# Patient Record
Sex: MALE | Race: BLACK OR AFRICAN AMERICAN | Employment: OTHER | URBAN - METROPOLITAN AREA
[De-identification: names, ages, dates, MRNs, and addresses within clinical notes are randomized per-mention and may not be internally consistent; named-entity substitution may affect disease eponyms.]

---

## 2017-01-03 ENCOUNTER — HOSPITAL ENCOUNTER (OUTPATIENT)
Dept: PHYSICAL THERAPY | Age: 67
Discharge: HOME | End: 2017-01-26
Attending: ORTHOPAEDIC SURGERY | Admitting: ORTHOPAEDIC SURGERY

## 2017-01-03 ASSESSMENT — PAIN DESCRIPTION - DESCRIPTORS: DESCRIPTORS: ACHING

## 2017-01-03 ASSESSMENT — PAIN DESCRIPTION - FREQUENCY: FREQUENCY: CONTINUOUS

## 2017-01-03 ASSESSMENT — PAIN DESCRIPTION - ONSET: ONSET: AWAKENED FROM SLEEP

## 2017-01-03 ASSESSMENT — PAIN DESCRIPTION - LOCATION: LOCATION: HIP;KNEE

## 2017-01-03 ASSESSMENT — PAIN DESCRIPTION - PAIN TYPE: TYPE: CHRONIC PAIN

## 2017-01-03 ASSESSMENT — PAIN SCALES - GENERAL: PAINLEVEL_OUTOF10: 9

## 2017-01-03 ASSESSMENT — PAIN DESCRIPTION - ORIENTATION: ORIENTATION: RIGHT;LEFT

## 2017-01-10 ENCOUNTER — HOSPITAL ENCOUNTER (OUTPATIENT)
Dept: PHYSICAL THERAPY | Age: 67
Discharge: HOME | End: 2017-01-10
Admitting: ORTHOPAEDIC SURGERY

## 2017-01-12 ENCOUNTER — OFFICE VISIT (OUTPATIENT)
Dept: PRIMARY CARE CLINIC | Age: 67
End: 2017-01-12

## 2017-01-12 VITALS
BODY MASS INDEX: 39.17 KG/M2 | SYSTOLIC BLOOD PRESSURE: 112 MMHG | DIASTOLIC BLOOD PRESSURE: 70 MMHG | OXYGEN SATURATION: 96 % | HEIGHT: 75 IN | HEART RATE: 70 BPM | TEMPERATURE: 97 F | WEIGHT: 315 LBS

## 2017-01-12 DIAGNOSIS — E78.2 MIXED HYPERLIPIDEMIA: ICD-10-CM

## 2017-01-12 DIAGNOSIS — Z00.00 PHYSICAL EXAM: Primary | ICD-10-CM

## 2017-01-12 DIAGNOSIS — Z12.11 SCREEN FOR COLON CANCER: ICD-10-CM

## 2017-01-12 DIAGNOSIS — I48.20 CHRONIC ATRIAL FIBRILLATION (HCC): ICD-10-CM

## 2017-01-12 DIAGNOSIS — E66.01 OBESITY, CLASS III, BMI 40-49.9 (MORBID OBESITY) (HCC): ICD-10-CM

## 2017-01-12 DIAGNOSIS — G89.29 CHRONIC MIDLINE LOW BACK PAIN WITHOUT SCIATICA: ICD-10-CM

## 2017-01-12 DIAGNOSIS — R73.9 HYPERGLYCEMIA: ICD-10-CM

## 2017-01-12 DIAGNOSIS — E55.9 VITAMIN D DEFICIENCY: ICD-10-CM

## 2017-01-12 DIAGNOSIS — J06.9 ACUTE URI: ICD-10-CM

## 2017-01-12 DIAGNOSIS — M54.50 CHRONIC MIDLINE LOW BACK PAIN WITHOUT SCIATICA: ICD-10-CM

## 2017-01-12 LAB
A/G RATIO: 1.5 (ref 1.1–2.2)
ALBUMIN SERPL-MCNC: 4.4 G/DL (ref 3.4–5)
ALP BLD-CCNC: 100 U/L (ref 40–129)
ALT SERPL-CCNC: 15 U/L (ref 10–40)
ANION GAP SERPL CALCULATED.3IONS-SCNC: 13 MMOL/L (ref 3–16)
AST SERPL-CCNC: 16 U/L (ref 15–37)
BASOPHILS ABSOLUTE: 0 K/UL (ref 0–0.2)
BASOPHILS RELATIVE PERCENT: 0.4 %
BILIRUB SERPL-MCNC: 0.8 MG/DL (ref 0–1)
BILIRUBIN URINE: NEGATIVE
BLOOD, URINE: NEGATIVE
BUN BLDV-MCNC: 14 MG/DL (ref 7–20)
CALCIUM SERPL-MCNC: 9 MG/DL (ref 8.3–10.6)
CHLORIDE BLD-SCNC: 106 MMOL/L (ref 99–110)
CHOLESTEROL, TOTAL: 133 MG/DL (ref 0–199)
CLARITY: CLEAR
CO2: 28 MMOL/L (ref 21–32)
COLOR: YELLOW
CREAT SERPL-MCNC: 1.1 MG/DL (ref 0.8–1.3)
EOSINOPHILS ABSOLUTE: 0.1 K/UL (ref 0–0.6)
EOSINOPHILS RELATIVE PERCENT: 1.3 %
EPITHELIAL CELLS, UA: 1 /HPF (ref 0–5)
GFR AFRICAN AMERICAN: >60
GFR NON-AFRICAN AMERICAN: >60
GLOBULIN: 3 G/DL
GLUCOSE BLD-MCNC: 84 MG/DL (ref 70–99)
GLUCOSE URINE: NEGATIVE MG/DL
HCT VFR BLD CALC: 43.1 % (ref 40.5–52.5)
HDLC SERPL-MCNC: 54 MG/DL (ref 40–60)
HEMOGLOBIN: 14 G/DL (ref 13.5–17.5)
HYALINE CASTS: 0 /HPF (ref 0–8)
INR BLD: 2.09 (ref 0.85–1.16)
KETONES, URINE: NEGATIVE MG/DL
LDL CHOLESTEROL CALCULATED: 69 MG/DL
LEUKOCYTE ESTERASE, URINE: NEGATIVE
LYMPHOCYTES ABSOLUTE: 2.4 K/UL (ref 1–5.1)
LYMPHOCYTES RELATIVE PERCENT: 43.5 %
MCH RBC QN AUTO: 28.2 PG (ref 26–34)
MCHC RBC AUTO-ENTMCNC: 32.4 G/DL (ref 31–36)
MCV RBC AUTO: 86.9 FL (ref 80–100)
MICROSCOPIC EXAMINATION: YES
MONOCYTES ABSOLUTE: 0.4 K/UL (ref 0–1.3)
MONOCYTES RELATIVE PERCENT: 7.9 %
NEUTROPHILS ABSOLUTE: 2.6 K/UL (ref 1.7–7.7)
NEUTROPHILS RELATIVE PERCENT: 46.9 %
NITRITE, URINE: NEGATIVE
PDW BLD-RTO: 16.1 % (ref 12.4–15.4)
PH UA: 6
PLATELET # BLD: 180 K/UL (ref 135–450)
PMV BLD AUTO: 7.5 FL (ref 5–10.5)
POTASSIUM SERPL-SCNC: 3.8 MMOL/L (ref 3.5–5.1)
PROTEIN UA: ABNORMAL MG/DL
PROTHROMBIN TIME: 24.2 SEC (ref 9.8–13)
RBC # BLD: 4.96 M/UL (ref 4.2–5.9)
RBC UA: 2 /HPF (ref 0–4)
SODIUM BLD-SCNC: 147 MMOL/L (ref 136–145)
SPECIFIC GRAVITY UA: 1.03
TOTAL PROTEIN: 7.4 G/DL (ref 6.4–8.2)
TRIGL SERPL-MCNC: 49 MG/DL (ref 0–150)
TSH SERPL DL<=0.05 MIU/L-ACNC: 2.34 UIU/ML (ref 0.27–4.2)
URINE TYPE: ABNORMAL
UROBILINOGEN, URINE: 0.2 E.U./DL
VITAMIN D 25-HYDROXY: 18.3 NG/ML
VLDLC SERPL CALC-MCNC: 10 MG/DL
WBC # BLD: 5.4 K/UL (ref 4–11)
WBC UA: 1 /HPF (ref 0–5)

## 2017-01-12 PROCEDURE — 99386 PREV VISIT NEW AGE 40-64: CPT | Performed by: INTERNAL MEDICINE

## 2017-01-12 RX ORDER — ATORVASTATIN CALCIUM 40 MG/1
40 TABLET, FILM COATED ORAL DAILY
COMMUNITY
End: 2017-10-23

## 2017-01-12 RX ORDER — ACETAMINOPHEN 500 MG
500 TABLET ORAL EVERY 6 HOURS PRN
Qty: 90 TABLET | Refills: 3 | Status: SHIPPED | OUTPATIENT
Start: 2017-01-12 | End: 2017-03-10 | Stop reason: SDUPTHER

## 2017-01-12 RX ORDER — ERGOCALCIFEROL (VITAMIN D2) 1250 MCG
50000 CAPSULE ORAL WEEKLY
Qty: 4 CAPSULE | Refills: 3 | Status: SHIPPED | OUTPATIENT
Start: 2017-01-12 | End: 2017-03-10 | Stop reason: SDUPTHER

## 2017-01-12 RX ORDER — GUAIFENESIN 600 MG/1
600 TABLET, EXTENDED RELEASE ORAL 2 TIMES DAILY
Qty: 14 TABLET | Refills: 0 | Status: SHIPPED | OUTPATIENT
Start: 2017-01-12 | End: 2017-03-10

## 2017-01-12 RX ORDER — DEXLANSOPRAZOLE 60 MG/1
60 CAPSULE, DELAYED RELEASE ORAL DAILY
COMMUNITY
End: 2017-03-10

## 2017-01-12 RX ORDER — POLYETHYLENE GLYCOL 3350 17 G/17G
17 POWDER, FOR SOLUTION ORAL DAILY
COMMUNITY
End: 2017-03-10

## 2017-01-12 ASSESSMENT — ENCOUNTER SYMPTOMS
COUGH: 0
WHEEZING: 0
GASTROINTESTINAL NEGATIVE: 1
EYES NEGATIVE: 1
BACK PAIN: 1
BLOOD IN STOOL: 0
CONSTIPATION: 0
DIARRHEA: 0
CHEST TIGHTNESS: 0
SHORTNESS OF BREATH: 1

## 2017-01-13 ENCOUNTER — TELEPHONE (OUTPATIENT)
Dept: BARIATRICS/WEIGHT MGMT | Age: 67
End: 2017-01-13

## 2017-01-13 LAB
ESTIMATED AVERAGE GLUCOSE: 116.9 MG/DL
HBA1C MFR BLD: 5.7 %

## 2017-01-14 DIAGNOSIS — R97.20 ABNORMAL PSA: Primary | ICD-10-CM

## 2017-01-14 LAB
PROSTATE SPECIFIC ANTIGEN FREE: 2.1 NG/ML
PROSTATE SPECIFIC ANTIGEN PERCENT FREE: 12 %
PROSTATE SPECIFIC ANTIGEN: 17.5 NG/ML (ref 0–4)

## 2017-02-22 ENCOUNTER — OFFICE VISIT (OUTPATIENT)
Dept: PRIMARY CARE CLINIC | Age: 67
End: 2017-02-22

## 2017-02-22 VITALS
BODY MASS INDEX: 44.55 KG/M2 | DIASTOLIC BLOOD PRESSURE: 62 MMHG | SYSTOLIC BLOOD PRESSURE: 106 MMHG | RESPIRATION RATE: 14 BRPM | OXYGEN SATURATION: 98 % | TEMPERATURE: 98.6 F | WEIGHT: 315 LBS | HEART RATE: 97 BPM

## 2017-02-22 DIAGNOSIS — G89.29 CHRONIC PAIN OF BOTH KNEES: ICD-10-CM

## 2017-02-22 DIAGNOSIS — J44.1 CHRONIC OBSTRUCTIVE PULMONARY DISEASE WITH ACUTE EXACERBATION (HCC): Primary | ICD-10-CM

## 2017-02-22 DIAGNOSIS — I48.20 CHRONIC ATRIAL FIBRILLATION (HCC): ICD-10-CM

## 2017-02-22 DIAGNOSIS — M25.562 CHRONIC PAIN OF BOTH KNEES: ICD-10-CM

## 2017-02-22 DIAGNOSIS — M25.561 CHRONIC PAIN OF BOTH KNEES: ICD-10-CM

## 2017-02-22 PROCEDURE — 99214 OFFICE O/P EST MOD 30 MIN: CPT | Performed by: FAMILY MEDICINE

## 2017-02-22 RX ORDER — ALBUTEROL SULFATE 90 UG/1
2 AEROSOL, METERED RESPIRATORY (INHALATION) EVERY 6 HOURS PRN
Qty: 1 INHALER | Refills: 3 | Status: SHIPPED | OUTPATIENT
Start: 2017-02-22 | End: 2017-03-10 | Stop reason: SDUPTHER

## 2017-02-22 RX ORDER — AZITHROMYCIN 250 MG/1
TABLET, FILM COATED ORAL
Qty: 1 PACKET | Refills: 0 | Status: SHIPPED | OUTPATIENT
Start: 2017-02-22 | End: 2017-03-04

## 2017-02-22 RX ORDER — METHYLPREDNISOLONE 4 MG/1
TABLET ORAL
Qty: 1 KIT | Refills: 0 | Status: SHIPPED | OUTPATIENT
Start: 2017-02-22 | End: 2017-02-28

## 2017-02-22 ASSESSMENT — ENCOUNTER SYMPTOMS
WHEEZING: 1
EYE PAIN: 0
APNEA: 0
SHORTNESS OF BREATH: 1
EYE DISCHARGE: 0
COLOR CHANGE: 0
EYE ITCHING: 0
NAUSEA: 0
CHEST TIGHTNESS: 0
SORE THROAT: 0
ABDOMINAL PAIN: 0
ANAL BLEEDING: 0
EYE REDNESS: 0
PHOTOPHOBIA: 0
CHOKING: 0
SINUS PRESSURE: 0
FACIAL SWELLING: 0
TROUBLE SWALLOWING: 0
CONSTIPATION: 0
COUGH: 0
BACK PAIN: 0
VOICE CHANGE: 0
VOMITING: 0
RECTAL PAIN: 0
BLOOD IN STOOL: 0

## 2017-03-10 ENCOUNTER — OFFICE VISIT (OUTPATIENT)
Dept: PRIMARY CARE CLINIC | Age: 67
End: 2017-03-10

## 2017-03-10 VITALS
SYSTOLIC BLOOD PRESSURE: 120 MMHG | BODY MASS INDEX: 44.25 KG/M2 | TEMPERATURE: 98 F | WEIGHT: 315 LBS | OXYGEN SATURATION: 93 % | HEART RATE: 72 BPM | DIASTOLIC BLOOD PRESSURE: 80 MMHG

## 2017-03-10 DIAGNOSIS — M17.0 PRIMARY OSTEOARTHRITIS OF BOTH KNEES: ICD-10-CM

## 2017-03-10 DIAGNOSIS — R97.20 ABNORMAL PSA: ICD-10-CM

## 2017-03-10 DIAGNOSIS — T46.4X5A COUGH DUE TO ACE INHIBITOR: Primary | ICD-10-CM

## 2017-03-10 DIAGNOSIS — E55.9 VITAMIN D DEFICIENCY: ICD-10-CM

## 2017-03-10 DIAGNOSIS — M54.50 CHRONIC MIDLINE LOW BACK PAIN WITHOUT SCIATICA: ICD-10-CM

## 2017-03-10 DIAGNOSIS — J44.1 CHRONIC OBSTRUCTIVE PULMONARY DISEASE WITH ACUTE EXACERBATION (HCC): ICD-10-CM

## 2017-03-10 DIAGNOSIS — K21.00 GASTROESOPHAGEAL REFLUX DISEASE WITH ESOPHAGITIS: ICD-10-CM

## 2017-03-10 DIAGNOSIS — R73.9 HYPERGLYCEMIA: ICD-10-CM

## 2017-03-10 DIAGNOSIS — R05.8 COUGH DUE TO ACE INHIBITOR: Primary | ICD-10-CM

## 2017-03-10 DIAGNOSIS — G89.29 CHRONIC MIDLINE LOW BACK PAIN WITHOUT SCIATICA: ICD-10-CM

## 2017-03-10 LAB — HBA1C MFR BLD: 6.1 %

## 2017-03-10 PROCEDURE — 99214 OFFICE O/P EST MOD 30 MIN: CPT | Performed by: INTERNAL MEDICINE

## 2017-03-10 PROCEDURE — 83036 HEMOGLOBIN GLYCOSYLATED A1C: CPT | Performed by: INTERNAL MEDICINE

## 2017-03-10 RX ORDER — MONTELUKAST SODIUM 10 MG/1
10 TABLET ORAL NIGHTLY
Qty: 30 TABLET | Refills: 3 | Status: SHIPPED | OUTPATIENT
Start: 2017-03-10 | End: 2017-12-20 | Stop reason: SDUPTHER

## 2017-03-10 RX ORDER — LANSOPRAZOLE 30 MG/1
30 CAPSULE, DELAYED RELEASE ORAL 2 TIMES DAILY
Qty: 60 CAPSULE | Refills: 0 | Status: SHIPPED | OUTPATIENT
Start: 2017-03-10 | End: 2017-04-08 | Stop reason: SDUPTHER

## 2017-03-10 RX ORDER — ACETAMINOPHEN 500 MG
500 TABLET ORAL EVERY 6 HOURS PRN
Qty: 90 TABLET | Refills: 3 | Status: SHIPPED | OUTPATIENT
Start: 2017-03-10 | End: 2017-03-15

## 2017-03-10 RX ORDER — ALBUTEROL SULFATE 90 UG/1
2 AEROSOL, METERED RESPIRATORY (INHALATION) EVERY 6 HOURS PRN
Qty: 1 INHALER | Refills: 3 | Status: SHIPPED | OUTPATIENT
Start: 2017-03-10

## 2017-03-10 RX ORDER — PREDNISONE 20 MG/1
40 TABLET ORAL DAILY
Qty: 12 TABLET | Refills: 0 | Status: SHIPPED | OUTPATIENT
Start: 2017-03-10 | End: 2017-03-16

## 2017-03-10 RX ORDER — ERGOCALCIFEROL (VITAMIN D2) 1250 MCG
50000 CAPSULE ORAL WEEKLY
Qty: 4 CAPSULE | Refills: 3 | Status: SHIPPED | OUTPATIENT
Start: 2017-03-10 | End: 2017-03-15

## 2017-03-10 ASSESSMENT — ENCOUNTER SYMPTOMS
BLOOD IN STOOL: 0
DIARRHEA: 0
CHEST TIGHTNESS: 0
GASTROINTESTINAL NEGATIVE: 1
BACK PAIN: 1
WHEEZING: 0
SHORTNESS OF BREATH: 1
EYES NEGATIVE: 1
CONSTIPATION: 0
COUGH: 0

## 2017-03-10 ASSESSMENT — PATIENT HEALTH QUESTIONNAIRE - PHQ9
2. FEELING DOWN, DEPRESSED OR HOPELESS: 1
1. LITTLE INTEREST OR PLEASURE IN DOING THINGS: 1
SUM OF ALL RESPONSES TO PHQ QUESTIONS 1-9: 2
SUM OF ALL RESPONSES TO PHQ9 QUESTIONS 1 & 2: 2

## 2017-03-15 ENCOUNTER — OFFICE VISIT (OUTPATIENT)
Dept: ORTHOPEDIC SURGERY | Age: 67
End: 2017-03-15

## 2017-03-15 VITALS
WEIGHT: 315 LBS | BODY MASS INDEX: 39.17 KG/M2 | DIASTOLIC BLOOD PRESSURE: 98 MMHG | HEART RATE: 83 BPM | HEIGHT: 75 IN | SYSTOLIC BLOOD PRESSURE: 159 MMHG

## 2017-03-15 DIAGNOSIS — M25.561 PAIN IN BOTH KNEES, UNSPECIFIED CHRONICITY: ICD-10-CM

## 2017-03-15 DIAGNOSIS — M25.562 PAIN IN BOTH KNEES, UNSPECIFIED CHRONICITY: ICD-10-CM

## 2017-03-15 DIAGNOSIS — M17.0 BILATERAL PRIMARY OSTEOARTHRITIS OF KNEE: Primary | ICD-10-CM

## 2017-03-15 PROCEDURE — 73564 X-RAY EXAM KNEE 4 OR MORE: CPT | Performed by: ORTHOPAEDIC SURGERY

## 2017-03-15 PROCEDURE — 20610 DRAIN/INJ JOINT/BURSA W/O US: CPT | Performed by: ORTHOPAEDIC SURGERY

## 2017-03-15 PROCEDURE — 99203 OFFICE O/P NEW LOW 30 MIN: CPT | Performed by: ORTHOPAEDIC SURGERY

## 2017-03-15 RX ORDER — OXYCODONE HYDROCHLORIDE 15 MG/1
10 TABLET ORAL
COMMUNITY
End: 2017-08-18

## 2017-03-23 ENCOUNTER — TELEPHONE (OUTPATIENT)
Dept: PRIMARY CARE CLINIC | Age: 67
End: 2017-03-23

## 2017-03-24 ENCOUNTER — OFFICE VISIT (OUTPATIENT)
Dept: PRIMARY CARE CLINIC | Age: 67
End: 2017-03-24

## 2017-03-24 VITALS
HEART RATE: 78 BPM | SYSTOLIC BLOOD PRESSURE: 120 MMHG | DIASTOLIC BLOOD PRESSURE: 80 MMHG | BODY MASS INDEX: 45.25 KG/M2 | WEIGHT: 315 LBS | OXYGEN SATURATION: 99 %

## 2017-03-24 DIAGNOSIS — R06.02 SOB (SHORTNESS OF BREATH): Primary | ICD-10-CM

## 2017-03-24 DIAGNOSIS — R97.20 ABNORMAL PSA: ICD-10-CM

## 2017-03-24 PROCEDURE — 99213 OFFICE O/P EST LOW 20 MIN: CPT | Performed by: INTERNAL MEDICINE

## 2017-03-24 RX ORDER — ALBUTEROL SULFATE 90 UG/1
2 AEROSOL, METERED RESPIRATORY (INHALATION)
COMMUNITY
Start: 2017-03-16 | End: 2017-04-06 | Stop reason: SDUPTHER

## 2017-03-24 RX ORDER — LOSARTAN POTASSIUM 25 MG/1
25 TABLET ORAL
COMMUNITY
Start: 2017-03-16 | End: 2017-08-18

## 2017-03-24 RX ORDER — FUROSEMIDE 20 MG/1
TABLET ORAL
COMMUNITY
Start: 2017-03-16 | End: 2017-08-18

## 2017-03-24 ASSESSMENT — ENCOUNTER SYMPTOMS
CHEST TIGHTNESS: 0
CONSTIPATION: 0
BACK PAIN: 1
COUGH: 0
GASTROINTESTINAL NEGATIVE: 1
DIARRHEA: 0
WHEEZING: 0
BLOOD IN STOOL: 0
SHORTNESS OF BREATH: 1
EYES NEGATIVE: 1

## 2017-03-27 ENCOUNTER — TELEPHONE (OUTPATIENT)
Dept: ORTHOPEDIC SURGERY | Age: 67
End: 2017-03-27

## 2017-03-27 DIAGNOSIS — M17.0 BILATERAL PRIMARY OSTEOARTHRITIS OF KNEE: Primary | ICD-10-CM

## 2017-03-29 ENCOUNTER — TELEPHONE (OUTPATIENT)
Dept: ORTHOPEDIC SURGERY | Age: 67
End: 2017-03-29

## 2017-03-29 ENCOUNTER — OFFICE VISIT (OUTPATIENT)
Dept: ORTHOPEDIC SURGERY | Age: 67
End: 2017-03-29

## 2017-03-29 VITALS — WEIGHT: 315 LBS | HEIGHT: 74 IN | BODY MASS INDEX: 40.43 KG/M2

## 2017-03-29 DIAGNOSIS — M17.0 BILATERAL PRIMARY OSTEOARTHRITIS OF KNEE: Primary | ICD-10-CM

## 2017-03-29 PROCEDURE — 20610 DRAIN/INJ JOINT/BURSA W/O US: CPT | Performed by: ORTHOPAEDIC SURGERY

## 2017-03-29 PROCEDURE — 99213 OFFICE O/P EST LOW 20 MIN: CPT | Performed by: ORTHOPAEDIC SURGERY

## 2017-03-30 ENCOUNTER — TELEPHONE (OUTPATIENT)
Dept: PRIMARY CARE CLINIC | Age: 67
End: 2017-03-30

## 2017-04-05 ENCOUNTER — TELEPHONE (OUTPATIENT)
Dept: PRIMARY CARE CLINIC | Age: 67
End: 2017-04-05

## 2017-04-05 ENCOUNTER — OFFICE VISIT (OUTPATIENT)
Dept: ORTHOPEDIC SURGERY | Age: 67
End: 2017-04-05

## 2017-04-05 VITALS — HEIGHT: 75 IN | BODY MASS INDEX: 39.17 KG/M2 | WEIGHT: 315 LBS

## 2017-04-05 DIAGNOSIS — M17.0 BILATERAL PRIMARY OSTEOARTHRITIS OF KNEE: Primary | ICD-10-CM

## 2017-04-05 PROCEDURE — 20610 DRAIN/INJ JOINT/BURSA W/O US: CPT | Performed by: NURSE PRACTITIONER

## 2017-04-08 DIAGNOSIS — K21.00 GASTROESOPHAGEAL REFLUX DISEASE WITH ESOPHAGITIS: ICD-10-CM

## 2017-04-10 ENCOUNTER — OFFICE VISIT (OUTPATIENT)
Dept: ORTHOPEDIC SURGERY | Age: 67
End: 2017-04-10

## 2017-04-10 ENCOUNTER — TELEPHONE (OUTPATIENT)
Dept: PRIMARY CARE CLINIC | Age: 67
End: 2017-04-10

## 2017-04-10 VITALS
WEIGHT: 315 LBS | HEIGHT: 75 IN | DIASTOLIC BLOOD PRESSURE: 73 MMHG | HEART RATE: 84 BPM | SYSTOLIC BLOOD PRESSURE: 111 MMHG | TEMPERATURE: 97 F | BODY MASS INDEX: 39.17 KG/M2

## 2017-04-10 DIAGNOSIS — Z96.643 STATUS POST TOTAL REPLACEMENT OF BOTH HIPS: Primary | ICD-10-CM

## 2017-04-10 PROCEDURE — 99213 OFFICE O/P EST LOW 20 MIN: CPT | Performed by: ORTHOPAEDIC SURGERY

## 2017-04-10 PROCEDURE — 73522 X-RAY EXAM HIPS BI 3-4 VIEWS: CPT | Performed by: ORTHOPAEDIC SURGERY

## 2017-04-10 RX ORDER — LANSOPRAZOLE 30 MG/1
CAPSULE, DELAYED RELEASE ORAL
Qty: 60 CAPSULE | Refills: 0 | Status: SHIPPED | OUTPATIENT
Start: 2017-04-10 | End: 2017-05-11

## 2017-04-10 RX ORDER — DEXLANSOPRAZOLE 60 MG/1
CAPSULE, DELAYED RELEASE ORAL
COMMUNITY
Start: 2017-03-07 | End: 2017-08-18

## 2017-04-10 RX ORDER — DIAZEPAM 10 MG/1
TABLET ORAL
COMMUNITY
Start: 2017-03-28 | End: 2017-07-05

## 2017-04-13 ENCOUNTER — OFFICE VISIT (OUTPATIENT)
Dept: ORTHOPEDIC SURGERY | Age: 67
End: 2017-04-13

## 2017-04-13 VITALS — WEIGHT: 315 LBS | HEIGHT: 75 IN | BODY MASS INDEX: 39.17 KG/M2

## 2017-04-13 DIAGNOSIS — M17.0 BILATERAL PRIMARY OSTEOARTHRITIS OF KNEE: Primary | ICD-10-CM

## 2017-04-13 PROCEDURE — 20610 DRAIN/INJ JOINT/BURSA W/O US: CPT | Performed by: NURSE PRACTITIONER

## 2017-05-11 ENCOUNTER — OFFICE VISIT (OUTPATIENT)
Dept: PRIMARY CARE CLINIC | Age: 67
End: 2017-05-11

## 2017-05-11 VITALS
RESPIRATION RATE: 17 BRPM | SYSTOLIC BLOOD PRESSURE: 120 MMHG | OXYGEN SATURATION: 95 % | WEIGHT: 315 LBS | HEIGHT: 75 IN | BODY MASS INDEX: 39.17 KG/M2 | DIASTOLIC BLOOD PRESSURE: 70 MMHG | HEART RATE: 78 BPM | TEMPERATURE: 97.4 F

## 2017-05-11 DIAGNOSIS — R60.0 LOCALIZED EDEMA: ICD-10-CM

## 2017-05-11 DIAGNOSIS — B35.6 TINEA CRURIS: Primary | ICD-10-CM

## 2017-05-11 DIAGNOSIS — K21.9 GASTROESOPHAGEAL REFLUX DISEASE WITHOUT ESOPHAGITIS: ICD-10-CM

## 2017-05-11 LAB
A/G RATIO: 1.3 (ref 1.1–2.2)
ALBUMIN SERPL-MCNC: 4.3 G/DL (ref 3.4–5)
ALP BLD-CCNC: 105 U/L (ref 40–129)
ALT SERPL-CCNC: 40 U/L (ref 10–40)
ANION GAP SERPL CALCULATED.3IONS-SCNC: 17 MMOL/L (ref 3–16)
AST SERPL-CCNC: 28 U/L (ref 15–37)
BASOPHILS ABSOLUTE: 0 K/UL (ref 0–0.2)
BASOPHILS RELATIVE PERCENT: 0.2 %
BILIRUB SERPL-MCNC: 0.9 MG/DL (ref 0–1)
BUN BLDV-MCNC: 15 MG/DL (ref 7–20)
CALCIUM SERPL-MCNC: 9.1 MG/DL (ref 8.3–10.6)
CHLORIDE BLD-SCNC: 103 MMOL/L (ref 99–110)
CO2: 24 MMOL/L (ref 21–32)
CREAT SERPL-MCNC: 1.2 MG/DL (ref 0.8–1.3)
EOSINOPHILS ABSOLUTE: 0 K/UL (ref 0–0.6)
EOSINOPHILS RELATIVE PERCENT: 0.1 %
GFR AFRICAN AMERICAN: >60
GFR NON-AFRICAN AMERICAN: >60
GLOBULIN: 3.3 G/DL
GLUCOSE BLD-MCNC: 101 MG/DL (ref 70–99)
HCT VFR BLD CALC: 43 % (ref 40.5–52.5)
HEMOGLOBIN: 13.7 G/DL (ref 13.5–17.5)
LYMPHOCYTES ABSOLUTE: 1.6 K/UL (ref 1–5.1)
LYMPHOCYTES RELATIVE PERCENT: 26.4 %
MCH RBC QN AUTO: 28.5 PG (ref 26–34)
MCHC RBC AUTO-ENTMCNC: 31.8 G/DL (ref 31–36)
MCV RBC AUTO: 89.4 FL (ref 80–100)
MONOCYTES ABSOLUTE: 0.2 K/UL (ref 0–1.3)
MONOCYTES RELATIVE PERCENT: 3.4 %
NEUTROPHILS ABSOLUTE: 4.2 K/UL (ref 1.7–7.7)
NEUTROPHILS RELATIVE PERCENT: 69.9 %
PDW BLD-RTO: 14.9 % (ref 12.4–15.4)
PLATELET # BLD: 188 K/UL (ref 135–450)
PMV BLD AUTO: 7.7 FL (ref 5–10.5)
POTASSIUM SERPL-SCNC: 4 MMOL/L (ref 3.5–5.1)
RBC # BLD: 4.81 M/UL (ref 4.2–5.9)
SODIUM BLD-SCNC: 144 MMOL/L (ref 136–145)
TOTAL PROTEIN: 7.6 G/DL (ref 6.4–8.2)
WBC # BLD: 6 K/UL (ref 4–11)

## 2017-05-11 PROCEDURE — 99214 OFFICE O/P EST MOD 30 MIN: CPT | Performed by: INTERNAL MEDICINE

## 2017-05-11 RX ORDER — CLOTRIMAZOLE AND BETAMETHASONE DIPROPIONATE 10; .64 MG/G; MG/G
CREAM TOPICAL
Qty: 4 TUBE | Refills: 3 | Status: SHIPPED | OUTPATIENT
Start: 2017-05-11 | End: 2017-09-12 | Stop reason: ALTCHOICE

## 2017-05-11 RX ORDER — OMEPRAZOLE 40 MG/1
40 CAPSULE, DELAYED RELEASE ORAL DAILY
Qty: 30 CAPSULE | Refills: 3 | Status: SHIPPED | OUTPATIENT
Start: 2017-05-11 | End: 2017-07-05

## 2017-05-11 ASSESSMENT — ENCOUNTER SYMPTOMS
BACK PAIN: 1
BLOOD IN STOOL: 0
WHEEZING: 0
COUGH: 0
EYES NEGATIVE: 1
CHEST TIGHTNESS: 0
GASTROINTESTINAL NEGATIVE: 1
DIARRHEA: 0
SHORTNESS OF BREATH: 1
CONSTIPATION: 0

## 2017-05-26 ENCOUNTER — OFFICE VISIT (OUTPATIENT)
Dept: PRIMARY CARE CLINIC | Age: 67
End: 2017-05-26

## 2017-05-26 VITALS
SYSTOLIC BLOOD PRESSURE: 133 MMHG | RESPIRATION RATE: 18 BRPM | WEIGHT: 315 LBS | BODY MASS INDEX: 47 KG/M2 | HEART RATE: 74 BPM | TEMPERATURE: 98 F | OXYGEN SATURATION: 93 % | DIASTOLIC BLOOD PRESSURE: 80 MMHG

## 2017-05-26 DIAGNOSIS — D86.9 SARCOIDOSIS: ICD-10-CM

## 2017-05-26 DIAGNOSIS — I48.91 ATRIAL FIBRILLATION, UNSPECIFIED TYPE (HCC): ICD-10-CM

## 2017-05-26 DIAGNOSIS — H57.9 EYE DISORDER: Primary | ICD-10-CM

## 2017-05-26 LAB
INR BLD: 4 (ref 0.9–1.1)
PROTHROMBIN TIME: 40.1 SECONDS (ref 11.6–14.4)

## 2017-05-26 PROCEDURE — 99215 OFFICE O/P EST HI 40 MIN: CPT | Performed by: INTERNAL MEDICINE

## 2017-05-26 RX ORDER — POTASSIUM CHLORIDE 1.5 G/1.77G
20 POWDER, FOR SOLUTION ORAL DAILY
Qty: 30 EACH | Refills: 5 | Status: SHIPPED | OUTPATIENT
Start: 2017-05-26 | End: 2017-10-03

## 2017-05-26 RX ORDER — FUROSEMIDE 20 MG/1
20 TABLET ORAL DAILY
Qty: 60 TABLET | Refills: 5 | Status: SHIPPED | OUTPATIENT
Start: 2017-05-26 | End: 2018-03-12

## 2017-05-26 ASSESSMENT — ENCOUNTER SYMPTOMS
WHEEZING: 0
DOUBLE VISION: 0
EYE REDNESS: 0
CHOKING: 0
PHOTOPHOBIA: 0
GASTROINTESTINAL NEGATIVE: 1
SHORTNESS OF BREATH: 0
EYE DISCHARGE: 0
STRIDOR: 0
ABDOMINAL PAIN: 0
APNEA: 0
FOREIGN BODY SENSATION: 0
NAUSEA: 0
VOMITING: 0
CHEST TIGHTNESS: 0
BLURRED VISION: 1
ALLERGIC/IMMUNOLOGIC NEGATIVE: 1
COUGH: 0

## 2017-05-31 ENCOUNTER — OFFICE VISIT (OUTPATIENT)
Dept: ORTHOPEDIC SURGERY | Age: 67
End: 2017-05-31

## 2017-05-31 VITALS
HEIGHT: 75 IN | BODY MASS INDEX: 39.17 KG/M2 | SYSTOLIC BLOOD PRESSURE: 141 MMHG | WEIGHT: 315 LBS | HEART RATE: 78 BPM | DIASTOLIC BLOOD PRESSURE: 85 MMHG

## 2017-05-31 DIAGNOSIS — M17.0 BILATERAL PRIMARY OSTEOARTHRITIS OF KNEE: Primary | ICD-10-CM

## 2017-05-31 PROCEDURE — 99213 OFFICE O/P EST LOW 20 MIN: CPT | Performed by: ORTHOPAEDIC SURGERY

## 2017-06-02 LAB — INR BLD: 2.6 (ref 0.8–1.4)

## 2017-07-05 ENCOUNTER — OFFICE VISIT (OUTPATIENT)
Dept: ORTHOPEDIC SURGERY | Age: 67
End: 2017-07-05

## 2017-07-05 ENCOUNTER — TELEPHONE (OUTPATIENT)
Dept: PRIMARY CARE CLINIC | Age: 67
End: 2017-07-05

## 2017-07-05 VITALS
HEART RATE: 70 BPM | WEIGHT: 315 LBS | HEIGHT: 75 IN | BODY MASS INDEX: 39.17 KG/M2 | DIASTOLIC BLOOD PRESSURE: 87 MMHG | SYSTOLIC BLOOD PRESSURE: 132 MMHG

## 2017-07-05 DIAGNOSIS — M17.0 BILATERAL PRIMARY OSTEOARTHRITIS OF KNEE: Primary | ICD-10-CM

## 2017-07-05 LAB — INR BLD: 2.5 (ref 0.8–1.4)

## 2017-07-05 PROCEDURE — 99213 OFFICE O/P EST LOW 20 MIN: CPT | Performed by: ORTHOPAEDIC SURGERY

## 2017-07-05 PROCEDURE — 20610 DRAIN/INJ JOINT/BURSA W/O US: CPT | Performed by: ORTHOPAEDIC SURGERY

## 2017-07-05 RX ORDER — GABAPENTIN 100 MG/1
100 CAPSULE ORAL 3 TIMES DAILY
COMMUNITY
End: 2017-07-05

## 2017-07-05 RX ORDER — GABAPENTIN 100 MG/1
100 CAPSULE ORAL 2 TIMES DAILY
Qty: 60 CAPSULE | Refills: 0 | Status: SHIPPED | OUTPATIENT
Start: 2017-07-05 | End: 2017-08-18

## 2017-07-07 ENCOUNTER — TELEPHONE (OUTPATIENT)
Dept: PRIMARY CARE CLINIC | Age: 67
End: 2017-07-07

## 2017-07-19 ENCOUNTER — OFFICE VISIT (OUTPATIENT)
Dept: PRIMARY CARE CLINIC | Age: 67
End: 2017-07-19

## 2017-07-19 VITALS
BODY MASS INDEX: 47 KG/M2 | TEMPERATURE: 97.2 F | DIASTOLIC BLOOD PRESSURE: 63 MMHG | WEIGHT: 315 LBS | HEART RATE: 86 BPM | OXYGEN SATURATION: 96 % | SYSTOLIC BLOOD PRESSURE: 101 MMHG

## 2017-07-19 DIAGNOSIS — Z12.11 SCREENING FOR COLON CANCER: ICD-10-CM

## 2017-07-19 DIAGNOSIS — R73.03 PREDIABETES: ICD-10-CM

## 2017-07-19 DIAGNOSIS — Z09 HOSPITAL DISCHARGE FOLLOW-UP: Primary | ICD-10-CM

## 2017-07-19 DIAGNOSIS — M25.50 ARTHRALGIA, UNSPECIFIED JOINT: ICD-10-CM

## 2017-07-19 DIAGNOSIS — K21.9 GASTROESOPHAGEAL REFLUX DISEASE, ESOPHAGITIS PRESENCE NOT SPECIFIED: ICD-10-CM

## 2017-07-19 DIAGNOSIS — R94.4 ABNORMAL RENAL FUNCTION TEST: ICD-10-CM

## 2017-07-19 DIAGNOSIS — Z96.643 S/P HIP REPLACEMENT, BILATERAL: ICD-10-CM

## 2017-07-19 DIAGNOSIS — R07.89 OTHER CHEST PAIN: ICD-10-CM

## 2017-07-19 PROBLEM — I10 BP (HIGH BLOOD PRESSURE): Status: ACTIVE | Noted: 2017-07-19

## 2017-07-19 LAB
ANION GAP SERPL CALCULATED.3IONS-SCNC: 14 MMOL/L (ref 3–16)
BUN BLDV-MCNC: 15 MG/DL (ref 7–20)
CALCIUM SERPL-MCNC: 9.2 MG/DL (ref 8.3–10.6)
CHLORIDE BLD-SCNC: 100 MMOL/L (ref 99–110)
CO2: 27 MMOL/L (ref 21–32)
CREAT SERPL-MCNC: 1.1 MG/DL (ref 0.8–1.3)
GFR AFRICAN AMERICAN: >60
GFR NON-AFRICAN AMERICAN: >60
GLUCOSE BLD-MCNC: 98 MG/DL (ref 70–99)
POTASSIUM SERPL-SCNC: 3.8 MMOL/L (ref 3.5–5.1)
SODIUM BLD-SCNC: 141 MMOL/L (ref 136–145)

## 2017-07-19 PROCEDURE — 99213 OFFICE O/P EST LOW 20 MIN: CPT | Performed by: NURSE PRACTITIONER

## 2017-07-19 RX ORDER — OXYCODONE AND ACETAMINOPHEN 10; 325 MG/1; MG/1
1 TABLET ORAL EVERY 4 HOURS PRN
COMMUNITY
Start: 2017-06-01

## 2017-07-19 RX ORDER — ERYTHROMYCIN 250 MG/1
TABLET, COATED ORAL
COMMUNITY
Start: 2017-07-05 | End: 2017-08-18

## 2017-07-19 RX ORDER — GABAPENTIN 100 MG/1
CAPSULE ORAL
COMMUNITY
Start: 2017-07-05 | End: 2017-08-18

## 2017-07-19 RX ORDER — OXYCODONE HYDROCHLORIDE 5 MG/1
TABLET ORAL
COMMUNITY
Start: 2017-07-15 | End: 2017-08-18

## 2017-07-19 RX ORDER — RANITIDINE 300 MG/1
300 TABLET ORAL NIGHTLY
Qty: 30 TABLET | Refills: 3 | Status: SHIPPED | OUTPATIENT
Start: 2017-07-19 | End: 2017-09-13 | Stop reason: SDUPTHER

## 2017-07-19 ASSESSMENT — ENCOUNTER SYMPTOMS
EYE REDNESS: 0
CHEST TIGHTNESS: 0
COUGH: 0
WHEEZING: 0
CHOKING: 0
NAUSEA: 1
SHORTNESS OF BREATH: 0
PHOTOPHOBIA: 0
STRIDOR: 0
CONSTIPATION: 0
APNEA: 0
BACK PAIN: 1
DIARRHEA: 0
ABDOMINAL PAIN: 1
VOMITING: 0
ALLERGIC/IMMUNOLOGIC NEGATIVE: 1
EYES NEGATIVE: 1
BLOOD IN STOOL: 0
EYE DISCHARGE: 0

## 2017-07-20 LAB
ESTIMATED AVERAGE GLUCOSE: 134.1 MG/DL
HBA1C MFR BLD: 6.3 %

## 2017-07-21 LAB
CHROMIUM, SERUM: 1.2 UG/L
MISCELLANEOUS LAB TEST ORDER: ABNORMAL

## 2017-08-11 ENCOUNTER — OFFICE VISIT (OUTPATIENT)
Dept: PRIMARY CARE CLINIC | Age: 67
End: 2017-08-11

## 2017-08-11 VITALS
HEART RATE: 85 BPM | WEIGHT: 315 LBS | OXYGEN SATURATION: 95 % | BODY MASS INDEX: 48.12 KG/M2 | RESPIRATION RATE: 18 BRPM | TEMPERATURE: 98 F | SYSTOLIC BLOOD PRESSURE: 130 MMHG | DIASTOLIC BLOOD PRESSURE: 80 MMHG

## 2017-08-11 DIAGNOSIS — R63.5 WEIGHT GAIN: Primary | ICD-10-CM

## 2017-08-11 DIAGNOSIS — Z87.39 HISTORY OF BURNING PAIN IN LEG: ICD-10-CM

## 2017-08-11 PROCEDURE — 99213 OFFICE O/P EST LOW 20 MIN: CPT | Performed by: INTERNAL MEDICINE

## 2017-08-11 RX ORDER — ALBUTEROL SULFATE 2.5 MG/3ML
2.5 SOLUTION RESPIRATORY (INHALATION)
COMMUNITY
Start: 2017-08-02 | End: 2017-08-18

## 2017-08-11 RX ORDER — AMITRIPTYLINE HYDROCHLORIDE 50 MG/1
50 TABLET, FILM COATED ORAL NIGHTLY
Qty: 30 TABLET | Refills: 3 | Status: SHIPPED | OUTPATIENT
Start: 2017-08-11 | End: 2017-10-03

## 2017-08-11 RX ORDER — FLUTICASONE PROPIONATE 50 MCG
1 SPRAY, SUSPENSION (ML) NASAL
COMMUNITY
Start: 2017-08-02 | End: 2017-08-18

## 2017-08-11 RX ORDER — AZATHIOPRINE 50 MG/1
TABLET ORAL
COMMUNITY
Start: 2017-08-10 | End: 2017-08-11 | Stop reason: CLARIF

## 2017-08-11 RX ORDER — AZATHIOPRINE 50 MG/1
50 TABLET ORAL
COMMUNITY
Start: 2017-08-09 | End: 2018-03-12

## 2017-08-11 ASSESSMENT — ENCOUNTER SYMPTOMS
GASTROINTESTINAL NEGATIVE: 1
CONSTIPATION: 0
BACK PAIN: 1
SHORTNESS OF BREATH: 1
EYES NEGATIVE: 1
DIARRHEA: 0
BLOOD IN STOOL: 0
CHEST TIGHTNESS: 0
COUGH: 0
WHEEZING: 0

## 2017-08-18 ENCOUNTER — OFFICE VISIT (OUTPATIENT)
Dept: PRIMARY CARE CLINIC | Age: 67
End: 2017-08-18

## 2017-08-18 VITALS
HEART RATE: 93 BPM | DIASTOLIC BLOOD PRESSURE: 80 MMHG | RESPIRATION RATE: 18 BRPM | SYSTOLIC BLOOD PRESSURE: 120 MMHG | OXYGEN SATURATION: 96 % | WEIGHT: 315 LBS | TEMPERATURE: 97.1 F | BODY MASS INDEX: 47.37 KG/M2

## 2017-08-18 DIAGNOSIS — I48.20 CHRONIC ATRIAL FIBRILLATION (HCC): Primary | ICD-10-CM

## 2017-08-18 DIAGNOSIS — R05.8 COUGH DUE TO ANGIOTENSIN-CONVERTING ENZYME INHIBITOR: ICD-10-CM

## 2017-08-18 DIAGNOSIS — R06.02 SOB (SHORTNESS OF BREATH): ICD-10-CM

## 2017-08-18 DIAGNOSIS — T46.4X5A COUGH DUE TO ANGIOTENSIN-CONVERTING ENZYME INHIBITOR: ICD-10-CM

## 2017-08-18 PROCEDURE — 99213 OFFICE O/P EST LOW 20 MIN: CPT | Performed by: INTERNAL MEDICINE

## 2017-08-18 ASSESSMENT — ENCOUNTER SYMPTOMS
CONSTIPATION: 0
WHEEZING: 0
EYES NEGATIVE: 1
BLOOD IN STOOL: 0
CHEST TIGHTNESS: 0
COUGH: 1
BACK PAIN: 1
SHORTNESS OF BREATH: 1
GASTROINTESTINAL NEGATIVE: 1
DIARRHEA: 0

## 2017-09-12 ENCOUNTER — PAT TELEPHONE (OUTPATIENT)
Dept: PREADMISSION TESTING | Age: 67
End: 2017-09-12

## 2017-09-12 VITALS — BODY MASS INDEX: 39.17 KG/M2 | WEIGHT: 315 LBS | HEIGHT: 75 IN

## 2017-09-12 RX ORDER — DEXLANSOPRAZOLE 60 MG/1
60 CAPSULE, DELAYED RELEASE ORAL DAILY
COMMUNITY
End: 2017-10-23

## 2017-09-12 RX ORDER — NITROGLYCERIN 0.3 MG/1
0.3 TABLET SUBLINGUAL PRN
COMMUNITY
Start: 2017-08-15

## 2017-09-12 RX ORDER — PREDNISONE 10 MG/1
10 TABLET ORAL DAILY
COMMUNITY
End: 2017-09-19 | Stop reason: SDUPTHER

## 2017-09-12 ASSESSMENT — PAIN DESCRIPTION - PAIN TYPE: TYPE: CHRONIC PAIN

## 2017-09-12 ASSESSMENT — PAIN - FUNCTIONAL ASSESSMENT: PAIN_FUNCTIONAL_ASSESSMENT: 0-10

## 2017-09-12 ASSESSMENT — PAIN SCALES - GENERAL: PAINLEVEL_OUTOF10: 8

## 2017-09-13 DIAGNOSIS — M25.50 ARTHRALGIA, UNSPECIFIED JOINT: ICD-10-CM

## 2017-09-13 DIAGNOSIS — Z96.643 S/P HIP REPLACEMENT, BILATERAL: ICD-10-CM

## 2017-09-14 RX ORDER — RANITIDINE 300 MG/1
TABLET ORAL
Qty: 30 TABLET | Refills: 3 | Status: SHIPPED | OUTPATIENT
Start: 2017-09-14 | End: 2018-03-12

## 2017-09-18 ENCOUNTER — OFFICE VISIT (OUTPATIENT)
Dept: ORTHOPEDIC SURGERY | Age: 67
End: 2017-09-18

## 2017-09-18 VITALS
HEIGHT: 75 IN | DIASTOLIC BLOOD PRESSURE: 82 MMHG | HEART RATE: 91 BPM | BODY MASS INDEX: 39.17 KG/M2 | WEIGHT: 315 LBS | SYSTOLIC BLOOD PRESSURE: 137 MMHG

## 2017-09-18 DIAGNOSIS — M25.531 RIGHT WRIST PAIN: ICD-10-CM

## 2017-09-18 DIAGNOSIS — M17.0 BILATERAL PRIMARY OSTEOARTHRITIS OF KNEE: Primary | ICD-10-CM

## 2017-09-18 DIAGNOSIS — M10.9 ACUTE GOUT OF RIGHT WRIST, UNSPECIFIED CAUSE: ICD-10-CM

## 2017-09-18 PROCEDURE — 20605 DRAIN/INJ JOINT/BURSA W/O US: CPT | Performed by: ORTHOPAEDIC SURGERY

## 2017-09-18 PROCEDURE — 99213 OFFICE O/P EST LOW 20 MIN: CPT | Performed by: ORTHOPAEDIC SURGERY

## 2017-09-18 RX ORDER — POTASSIUM CHLORIDE 1500 MG/1
TABLET, EXTENDED RELEASE ORAL
COMMUNITY
Start: 2017-08-27 | End: 2017-10-03

## 2017-09-18 RX ORDER — LOSARTAN POTASSIUM 25 MG/1
TABLET ORAL
COMMUNITY
Start: 2017-07-25 | End: 2017-10-03

## 2017-09-18 RX ORDER — GABAPENTIN 100 MG/1
CAPSULE ORAL
COMMUNITY
Start: 2017-07-05 | End: 2017-10-03

## 2017-09-18 RX ORDER — POLYETHYLENE GLYCOL 3350, SODIUM CHLORIDE, SODIUM BICARBONATE, POTASSIUM CHLORIDE 420; 11.2; 5.72; 1.48 G/4L; G/4L; G/4L; G/4L
POWDER, FOR SOLUTION ORAL
COMMUNITY
Start: 2017-09-07 | End: 2017-10-03

## 2017-09-18 RX ORDER — WARFARIN SODIUM 5 MG/1
TABLET ORAL
COMMUNITY
Start: 2017-08-27 | End: 2017-10-03

## 2017-09-18 RX ORDER — ERYTHROMYCIN 250 MG/1
TABLET, COATED ORAL
COMMUNITY
Start: 2017-09-14 | End: 2017-10-03

## 2017-09-18 RX ORDER — ALBUTEROL SULFATE 2.5 MG/3ML
SOLUTION RESPIRATORY (INHALATION)
COMMUNITY
Start: 2017-08-02

## 2017-09-18 RX ORDER — FLUTICASONE PROPIONATE 50 MCG
SPRAY, SUSPENSION (ML) NASAL
COMMUNITY
Start: 2017-09-11

## 2017-09-19 ENCOUNTER — OFFICE VISIT (OUTPATIENT)
Dept: PRIMARY CARE CLINIC | Age: 67
End: 2017-09-19

## 2017-09-19 VITALS
HEIGHT: 75 IN | SYSTOLIC BLOOD PRESSURE: 149 MMHG | RESPIRATION RATE: 16 BRPM | TEMPERATURE: 98.4 F | DIASTOLIC BLOOD PRESSURE: 81 MMHG | BODY MASS INDEX: 39.17 KG/M2 | HEART RATE: 88 BPM | OXYGEN SATURATION: 96 % | WEIGHT: 315 LBS

## 2017-09-19 DIAGNOSIS — M10.9 ACUTE GOUT OF RIGHT WRIST, UNSPECIFIED CAUSE: Primary | ICD-10-CM

## 2017-09-19 PROCEDURE — 99213 OFFICE O/P EST LOW 20 MIN: CPT | Performed by: INTERNAL MEDICINE

## 2017-09-19 RX ORDER — PREDNISONE 20 MG/1
40 TABLET ORAL DAILY
Qty: 14 TABLET | Refills: 0 | Status: SHIPPED | OUTPATIENT
Start: 2017-09-19 | End: 2017-09-26

## 2017-09-19 ASSESSMENT — ENCOUNTER SYMPTOMS
BLOOD IN STOOL: 0
GASTROINTESTINAL NEGATIVE: 1
EYES NEGATIVE: 1
WHEEZING: 0
DIARRHEA: 0
CONSTIPATION: 0
BACK PAIN: 1
CHEST TIGHTNESS: 0

## 2017-09-26 ENCOUNTER — TELEPHONE (OUTPATIENT)
Dept: PRIMARY CARE CLINIC | Age: 67
End: 2017-09-26

## 2017-09-26 ENCOUNTER — OFFICE VISIT (OUTPATIENT)
Dept: PRIMARY CARE CLINIC | Age: 67
End: 2017-09-26

## 2017-09-26 VITALS
BODY MASS INDEX: 39.17 KG/M2 | DIASTOLIC BLOOD PRESSURE: 80 MMHG | HEIGHT: 75 IN | TEMPERATURE: 98.3 F | SYSTOLIC BLOOD PRESSURE: 140 MMHG | OXYGEN SATURATION: 96 % | RESPIRATION RATE: 16 BRPM | WEIGHT: 315 LBS

## 2017-09-26 DIAGNOSIS — M10.9 ACUTE GOUT OF RIGHT WRIST, UNSPECIFIED CAUSE: ICD-10-CM

## 2017-09-26 DIAGNOSIS — M10.9 ACUTE GOUT OF RIGHT WRIST, UNSPECIFIED CAUSE: Primary | ICD-10-CM

## 2017-09-26 PROCEDURE — 99213 OFFICE O/P EST LOW 20 MIN: CPT | Performed by: INTERNAL MEDICINE

## 2017-09-26 RX ORDER — METHYLPREDNISOLONE 4 MG/1
TABLET ORAL
Qty: 21 TABLET | Refills: 0 | Status: SHIPPED | OUTPATIENT
Start: 2017-09-26 | End: 2017-10-02

## 2017-09-26 RX ORDER — COLCHICINE 0.6 MG/1
0.6 TABLET ORAL DAILY
Qty: 30 TABLET | Refills: 1 | Status: SHIPPED | OUTPATIENT
Start: 2017-09-26 | End: 2017-09-26

## 2017-09-26 ASSESSMENT — ENCOUNTER SYMPTOMS
BLOOD IN STOOL: 0
BACK PAIN: 1
EYES NEGATIVE: 1
GASTROINTESTINAL NEGATIVE: 1
WHEEZING: 0
DIARRHEA: 0
CHEST TIGHTNESS: 0
CONSTIPATION: 0

## 2017-09-27 LAB
BASOPHILS ABSOLUTE: 0 K/UL (ref 0–0.2)
BASOPHILS RELATIVE PERCENT: 0.2 %
EOSINOPHILS ABSOLUTE: 0.1 K/UL (ref 0–0.6)
EOSINOPHILS RELATIVE PERCENT: 0.9 %
HCT VFR BLD CALC: 41.1 % (ref 40.5–52.5)
HEMOGLOBIN: 13.1 G/DL (ref 13.5–17.5)
LYMPHOCYTES ABSOLUTE: 1.1 K/UL (ref 1–5.1)
LYMPHOCYTES RELATIVE PERCENT: 13.7 %
MCH RBC QN AUTO: 28.2 PG (ref 26–34)
MCHC RBC AUTO-ENTMCNC: 31.9 G/DL (ref 31–36)
MCV RBC AUTO: 88.7 FL (ref 80–100)
MONOCYTES ABSOLUTE: 0.3 K/UL (ref 0–1.3)
MONOCYTES RELATIVE PERCENT: 3.8 %
NEUTROPHILS ABSOLUTE: 6.3 K/UL (ref 1.7–7.7)
NEUTROPHILS RELATIVE PERCENT: 81.4 %
PDW BLD-RTO: 15.1 % (ref 12.4–15.4)
PLATELET # BLD: 208 K/UL (ref 135–450)
PMV BLD AUTO: 7.1 FL (ref 5–10.5)
RBC # BLD: 4.63 M/UL (ref 4.2–5.9)
SEDIMENTATION RATE, ERYTHROCYTE: 36 MM/HR (ref 0–20)
WBC # BLD: 7.8 K/UL (ref 4–11)

## 2017-10-03 ENCOUNTER — OFFICE VISIT (OUTPATIENT)
Dept: PRIMARY CARE CLINIC | Age: 67
End: 2017-10-03

## 2017-10-03 VITALS
WEIGHT: 315 LBS | OXYGEN SATURATION: 95 % | TEMPERATURE: 97.9 F | RESPIRATION RATE: 16 BRPM | DIASTOLIC BLOOD PRESSURE: 80 MMHG | HEART RATE: 89 BPM | HEIGHT: 75 IN | SYSTOLIC BLOOD PRESSURE: 140 MMHG | BODY MASS INDEX: 39.17 KG/M2

## 2017-10-03 DIAGNOSIS — M25.531 WRIST PAIN, ACUTE, RIGHT: Primary | ICD-10-CM

## 2017-10-03 PROCEDURE — 99213 OFFICE O/P EST LOW 20 MIN: CPT | Performed by: INTERNAL MEDICINE

## 2017-10-03 RX ORDER — METHYLPREDNISOLONE 4 MG/1
TABLET ORAL
Qty: 21 TABLET | Refills: 0 | Status: SHIPPED | OUTPATIENT
Start: 2017-10-03 | End: 2017-10-09

## 2017-10-03 ASSESSMENT — ENCOUNTER SYMPTOMS
BLOOD IN STOOL: 0
CHEST TIGHTNESS: 0
DIARRHEA: 0
EYES NEGATIVE: 1
BACK PAIN: 1
GASTROINTESTINAL NEGATIVE: 1
CONSTIPATION: 0
WHEEZING: 0

## 2017-10-03 NOTE — PROGRESS NOTES
problems. The patient is not nervous/anxious. Objective:   Physical Exam   Constitutional: He is oriented to person, place, and time. No distress. Neck: Neck supple. Cardiovascular: Normal rate, regular rhythm and normal heart sounds. Pulmonary/Chest: Breath sounds normal. He has no wheezes. He has no rales. He exhibits no tenderness (S/P ICD placement). Abdominal: He exhibits distension (obese). Musculoskeletal: He exhibits tenderness (right wrist). Neurological: He is alert and oriented to person, place, and time. He has normal strength. Skin: Skin is warm. Psychiatric: His behavior is normal. His mood appears anxious. Vitals reviewed. Assessment:      Madhu Mckee was seen today for wrist pain and arm pain. Diagnoses and all orders for this visit:    Wrist pain, acute, right  ? Etiology . -     methylPREDNISolone (MEDROL DOSEPACK) 4 MG tablet; Take by mouth.  -     diclofenac sodium (VOLTAREN) 1 % GEL; Apply 4 g topically 4 times daily          Chronic atrial fibrillation (HCC)   . Was seeing  Cardiology at [facility] . To  see [facility]      SOB (shortness of breath)   Multifactorial  ;  Slightly Better with Fluid restriction < 7 glasses + Lasix 20 mg / d   -     Cough due to angiotensin-converting enzyme inhibitor  Off  Losartan         History of burning pain in leg  Cont Elavil for now ; if no change ,will stop it       Sarcoidosis 4/17  ;  Now On imuran  + Prednisone        Abnormal PSA   did see Urology . Advised to recheck PSA in 2-3 mths as this appears to be an acute elevation         Primary osteoarthritis of both knees    Getting Narcotics from Pain management .  S/P IA steroid inj         Mixed hyperlipidemia   Controlled On lipitor 40   -     Chronic atrial fibrillation (HCC)  On coumadin  INR foll at [facility]   -  Vitamin D deficiency take weekly Vit D             Plan:      Self Management Goals    Know which medication is for what condition:   Know side effects of medications, and discuss with doctor   Discuss side effects and instructions on new medications. Barriers to medication compliance addressed. All patient questions answered. Pt voiced understanding. Know correct dose/frequency of medications  Take medications at the same time each day  Stay current on medication refills  If taking OTC's check with MD/pharmacy first about interactions    LDL goal 207 or less  Systolic BP < or equal to 462  Diastolic BP < or equal to 85    Advised  Flu and Pneumonia Vax  Set targets for weight loss 4 lbs per month  Exercise 3-5 times per week as tolerated   Keep check of weight  Weighting machine    On a scale of 1 to 5 how confident are you in these goals?   3/5    Education materials given

## 2017-10-03 NOTE — MR AVS SNAPSHOT
After Visit Summary             Carrie Lim   10/3/2017 3:20 PM   Office Visit    Description:  Male : 1950   Provider:  Jatin Gandhi MD   Department:  [address] [facility]              Your Follow-Up and Future Appointments         Below is a list of your follow-up and future appointments. This may not be a complete list as you may have made appointments directly with providers that we are not aware of or your providers may have made some for you. Please call your providers to confirm appointments. It is important to keep your appointments. Please bring your current insurance card, photo ID, co-pay, and all medication bottles to your appointment. If self-pay, payment is expected at the time of service. Your To-Do List     Future Appointments Provider Department Dept Phone    10/25/2017 2:30 PM Иван Cade MD; [facility] [address] [facility] [phone]    Follow-Up    Return if symptoms worsen or fail to improve. Information from Your Visit        Department     Name Address Phone Fax    [address] [phone]      You Were Seen for:         Comments    Wrist pain, acute, right   [[id number]]         Vital Signs     Blood Pressure Pulse Temperature Respirations Height Weight    140/80 89 97.9 °F (36.6 °C) (Oral) 16 6' 2.5\" (1.892 m) 382 lb (173.3 kg)    Oxygen Saturation Body Mass Index Smoking Status             95% 48.39 kg/m2 Former Smoker         Additional Information about your Body Mass Index (BMI)           Your BMI as listed above is considered obese (30 or more). BMI is an estimate of body fat, calculated from your height and weight. The higher your BMI, the greater your risk of heart disease, high blood pressure, type 2 diabetes, stroke, gallstones, arthritis, sleep apnea, and certain cancers. BMI is not perfect.   It may overestimate body fat in athletes and people who are more muscular. Even a small weight loss (between 5 and 10 percent of your current weight) by decreasing your calorie intake and becoming more physically active will help lower your risk of developing or worsening diseases associated with obesity. Learn more at: [employer]             Today's Medication Changes          These changes are accurate as of: 10/3/17  3:22 PM.  If you have any questions, ask your nurse or doctor. START taking these medications           diclofenac sodium 1 % Gel   Commonly known as:  VOLTAREN   Instructions:  Apply 4 g topically 4 times daily   Quantity:  1 Tube   Refills:  3   Started by:  Gabbie Albarran MD       methylPREDNISolone 4 MG tablet   Commonly known as:  MEDROL DOSEPACK   Instructions: Take by mouth. Quantity:  21 tablet   Refills:  0   Started by:  Gabbie Albarran MD         CHANGE how you take these medications           * warfarin 10 MG tablet   Commonly known as:  COUMADIN   Instructions: Take 10 mg by mouth Indications: Sunday, Tuesday, Wednesday, Friday and Saturday   Refills:  0   What changed:  Another medication with the same name was removed. Continue taking this medication, and follow the directions you see here. Changed by: Jairo Gentile RN       * warfarin 7.5 MG tablet   Commonly known as:  COUMADIN   Instructions: Take 7.5 mg by mouth Indications: Monday  and Thursday   Refills:  0   What changed:  Another medication with the same name was removed. Continue taking this medication, and follow the directions you see here. Changed by: Jairo Gentile RN       * Notice: This list has 2 medication(s) that are the same as other medications prescribed for you. Read the directions carefully, and ask your doctor or other care provider to review them with you.       STOP taking these medications           amitriptyline 50 MG tablet   Commonly known as:  ELAVIL Stopped by:  Christiano Vernon MD       [facility] ANTACID 061-884-70 MG/5ML Susp   Generic drug:  aluminum & magnesium hydroxide-simethicone   Stopped by:  Christiano Vernon MD       erythromycin base 250 MG tablet   Commonly known as:  E-MYCIN   Stopped by:  Christiano Vernon MD       gabapentin 100 MG capsule   Commonly known as:  NEURONTIN   Stopped by:  Christiano Vernon MD       KLOR-CON M20 20 MEQ extended release tablet   Generic drug:  potassium chloride   Stopped by:  Christiano Vernon MD       losartan 25 MG tablet   Commonly known as:  COZAAR   Stopped by:  Christiano Vernon MD       nystatin 792067 UNIT/ML suspension   Commonly known as:  MYCOSTATIN   Stopped by:  Christiano Vernon MD       polyethylene glycol-electrolytes 420 g solution   Commonly known as:  Mayme Hang by:  Christiano Vernon MD       potassium chloride 20 MEQ packet   Commonly known as:  KLOR-CON   Stopped by:  Christiano Vernon MD            Where to Get Your Medications      These medications were sent to [place] [address], [address] [place] [address]., [address] [place] [address]     Phone:  [phone]     diclofenac sodium 1 % Gel    methylPREDNISolone 4 MG tablet               Your Current Medications Are              methylPREDNISolone (MEDROL DOSEPACK) 4 MG tablet Take by mouth. diclofenac sodium (VOLTAREN) 1 % GEL Apply 4 g topically 4 times daily    albuterol (PROVENTIL) (2.5 MG/3ML) 0.083% nebulizer solution     fluticasone (FLONASE) 50 MCG/ACT nasal spray     ranitidine (ZANTAC) 300 MG tablet TAKE 1 TABLET BY MOUTH NIGHTLY    nitroGLYCERIN (NITROSTAT) 0.3 MG SL tablet Place 0.3 mg under the tongue as needed    dexlansoprazole (DEXILANT) 60 MG CPDR delayed release capsule Take 60 mg by mouth daily    azaTHIOprine (IMURAN) 50 MG tablet Take 50 mg by mouth    oxyCODONE-acetaminophen (PERCOCET)  MG per tablet Take 1 tablet by mouth every 4 hours as needed  . Dislocation of hip joint prosthesis ([facility] [address]    Chronic venous insufficiency    Hyperlipidemia      Immunizations as of 10/3/2017     Name Date    Influenza Vaccine, unspecified formulation 10/24/2015, 10/18/2013, 9/13/2011, 11/29/2008, 12/25/2007    Influenza Whole 9/18/2012    PPD Test 12/9/2015    Pneumococcal Polysaccharide ([id number]) 5/8/2012    Tdap (Boostrix, Adacel) 11/17/2014      Preventive Care        Date Due    One-time abdominal aortic aneurism (AAA) screening is recommended for all men between the age of 73-68 who have ever smoked 1950    Hepatitis C screening is recommended for all adults regardless of risk factors born between [facility] at least once (lifetime) who have never been tested. 1950    HIV screening is recommended for all people regardless of risk factors  aged 15-65 years at least once (lifetime) who have never been HIV tested. 4/5/1967    Colonoscopy 4/5/2002    Zoster Vaccine 4/5/2012    Pneumococcal Vaccines (two) for all adults aged 72 and over (1 of 2 - PCV13) 4/5/2017    Yearly Flu Vaccine (1) 9/1/2017    Cholesterol Screening 1/12/2022    Tetanus Combination Vaccine (2 - Td) 11/17/2024            MyChart Signup           Our records indicate that you have declined MyChart signup.

## 2017-10-23 ENCOUNTER — PAT TELEPHONE (OUTPATIENT)
Dept: PREADMISSION TESTING | Age: 67
End: 2017-10-23

## 2017-10-23 RX ORDER — ATORVASTATIN CALCIUM 40 MG/1
TABLET, FILM COATED ORAL
COMMUNITY
Start: 2017-06-01

## 2017-10-23 RX ORDER — DEXLANSOPRAZOLE 60 MG/1
CAPSULE, DELAYED RELEASE ORAL
Refills: 5 | COMMUNITY
Start: 2017-09-21

## 2017-10-23 RX ORDER — DEXLANSOPRAZOLE 60 MG/1
CAPSULE, DELAYED RELEASE ORAL
COMMUNITY
Start: 2017-06-01 | End: 2017-10-23

## 2017-10-23 RX ORDER — AMITRIPTYLINE HYDROCHLORIDE 50 MG/1
TABLET, FILM COATED ORAL
COMMUNITY
Start: 2017-08-11 | End: 2018-03-12

## 2017-10-23 ASSESSMENT — PAIN SCALES - GENERAL: PAINLEVEL_OUTOF10: 8

## 2017-10-23 ASSESSMENT — PAIN DESCRIPTION - ORIENTATION: ORIENTATION: OTHER (COMMENT)

## 2017-10-23 ASSESSMENT — PAIN - FUNCTIONAL ASSESSMENT: PAIN_FUNCTIONAL_ASSESSMENT: 0-10

## 2017-10-23 ASSESSMENT — PAIN DESCRIPTION - LOCATION: LOCATION: KNEE

## 2017-10-23 NOTE — PRE-PROCEDURE INSTRUCTIONS
surgery. All jewelry must be removed. If you have dentures, they will be removed before going to operating room. For your convenience, we will provide you with a container. If you wear contact lenses or glasses, they will be removed, please bring a case for them. If you have a living will and a durable power of  for healthcare, please bring in a copy. As part of our patient safety program to minimize surgical site infections, we ask you to do the following:    · Please notify your surgeon if you develop any illness between         now and the  day of your surgery. · This includes a cough, cold, fever, sore throat, nausea,         or vomiting, and diarrhea, etc.  ·  Please notify your surgeon if you experience dizziness, shortness         of breath or blurred vision between now and the time of your surgery. Do not shave your operative site 96 hours prior to surgery. For face and neck surgery, men may use an electric razor 48 hours   prior to surgery. You may shower the night before surgery or the morning of   your surgery with an antibacterial soap. You will need to bring a photo ID and insurance card    [facility] has an onsite pharmacy, would you like to utilize our pharmacy     If you will be staying overnight and use a C-pap machine, please bring   your C-pap to hospital     Our goal is to provide you with excellent care, therefore, visitors will be limited to two(2) in the room at a time so that we may focus on providing this care for you. Please contact pre-admission testing if you have any further questions. [facility] phone number:  [phone] [facility] fax number:  [phone]  Please note these are generalized instructions for all surgical cases, you may be provided with more specific instructions according to your surgery.

## 2017-10-25 ENCOUNTER — HOSPITAL ENCOUNTER (OUTPATIENT)
Dept: ENDOSCOPY | Age: 67
Discharge: HOME | End: 2017-10-25
Admitting: INTERNAL MEDICINE

## 2017-10-25 VITALS
WEIGHT: 315 LBS | RESPIRATION RATE: 18 BRPM | BODY MASS INDEX: 49.14 KG/M2 | TEMPERATURE: 98 F | OXYGEN SATURATION: 98 % | HEART RATE: 84 BPM | SYSTOLIC BLOOD PRESSURE: 150 MMHG | DIASTOLIC BLOOD PRESSURE: 86 MMHG

## 2017-10-25 RX ORDER — MEPERIDINE HYDROCHLORIDE 25 MG/ML
12.5 INJECTION INTRAMUSCULAR; INTRAVENOUS; SUBCUTANEOUS EVERY 5 MIN PRN
Status: DISCONTINUED | OUTPATIENT
Start: 2017-10-25 | End: 2017-10-26 | Stop reason: HOSPADM

## 2017-10-25 RX ORDER — SODIUM CHLORIDE 0.9 % (FLUSH) 0.9 %
10 SYRINGE (ML) INJECTION PRN
Status: DISCONTINUED | OUTPATIENT
Start: 2017-10-25 | End: 2017-10-26 | Stop reason: HOSPADM

## 2017-10-25 RX ORDER — SODIUM CHLORIDE 0.9 % (FLUSH) 0.9 %
10 SYRINGE (ML) INJECTION EVERY 12 HOURS SCHEDULED
Status: DISCONTINUED | OUTPATIENT
Start: 2017-10-25 | End: 2017-10-25

## 2017-10-25 RX ORDER — SODIUM CHLORIDE 0.9 % (FLUSH) 0.9 %
10 SYRINGE (ML) INJECTION EVERY 12 HOURS SCHEDULED
Status: DISCONTINUED | OUTPATIENT
Start: 2017-10-25 | End: 2017-10-26 | Stop reason: HOSPADM

## 2017-10-25 RX ORDER — PREDNISONE 10 MG/1
10 TABLET ORAL DAILY
COMMUNITY
End: 2018-06-27

## 2017-10-25 RX ORDER — SODIUM CHLORIDE 9 MG/ML
INJECTION, SOLUTION INTRAVENOUS CONTINUOUS
Status: DISCONTINUED | OUTPATIENT
Start: 2017-10-25 | End: 2017-10-26 | Stop reason: HOSPADM

## 2017-10-25 RX ORDER — FENTANYL CITRATE 50 UG/ML
50 INJECTION, SOLUTION INTRAMUSCULAR; INTRAVENOUS EVERY 5 MIN PRN
Status: DISCONTINUED | OUTPATIENT
Start: 2017-10-25 | End: 2017-10-26 | Stop reason: HOSPADM

## 2017-10-25 RX ORDER — ONDANSETRON 2 MG/ML
4 INJECTION INTRAMUSCULAR; INTRAVENOUS
Status: ACTIVE | OUTPATIENT
Start: 2017-10-25 | End: 2017-10-25

## 2017-10-25 RX ORDER — FENTANYL CITRATE 50 UG/ML
25 INJECTION, SOLUTION INTRAMUSCULAR; INTRAVENOUS EVERY 5 MIN PRN
Status: DISCONTINUED | OUTPATIENT
Start: 2017-10-25 | End: 2017-10-26 | Stop reason: HOSPADM

## 2017-10-25 RX ORDER — SODIUM CHLORIDE 9 MG/ML
INJECTION, SOLUTION INTRAVENOUS CONTINUOUS
Status: DISCONTINUED | OUTPATIENT
Start: 2017-10-25 | End: 2017-10-25

## 2017-10-25 RX ORDER — SODIUM CHLORIDE 0.9 % (FLUSH) 0.9 %
10 SYRINGE (ML) INJECTION PRN
Status: DISCONTINUED | OUTPATIENT
Start: 2017-10-25 | End: 2017-10-25

## 2017-10-25 RX ADMIN — SODIUM CHLORIDE: 9 INJECTION, SOLUTION INTRAVENOUS at 14:02

## 2017-10-25 ASSESSMENT — PAIN SCALES - GENERAL
PAINLEVEL_OUTOF10: 0

## 2017-10-25 ASSESSMENT — PAIN DESCRIPTION - DESCRIPTORS: DESCRIPTORS: BURNING

## 2017-10-25 ASSESSMENT — LIFESTYLE VARIABLES: SMOKING_STATUS: 0

## 2017-10-25 ASSESSMENT — PAIN - FUNCTIONAL ASSESSMENT: PAIN_FUNCTIONAL_ASSESSMENT: 0-10

## 2017-10-25 ASSESSMENT — ENCOUNTER SYMPTOMS: SHORTNESS OF BREATH: 0

## 2017-10-25 NOTE — ANESTHESIA POST-OP
[facility] Department of Anesthesiology  Post-Anesthesia Note       Name:  Ramesh Evans                                         Age:  74 y.o.   MRN:  [id number]     Last Vitals & Oxygen Saturation: BP (!) 150/86   Pulse 84   Temp 98 °F (36.7 °C) (Temporal)   Resp 18   Wt (!) 387 lb 14.4 oz (176 kg)   SpO2 98%   BMI 49.14 kg/m²   Vitals:    10/25/17 1548 10/25/17 1549 10/25/17 1552 10/25/17 1630   BP:   139/84 (!) 150/86   Pulse: 79 78 76 84   Resp: 15 20 18 18   Temp:   98 °F (36.7 °C)    TempSrc:   Temporal    SpO2: 96% 95% 98% 98%   Weight:           Level of consciousness: awake, alert and oriented    Respiratory: stable     Cardiovascular: stable     Hydration: stable     PONV: stable     Post-op pain: adequate analgesia    Post-op assessment: no apparent anesthetic complications and tolerated procedure well    Complications:  none    March MD Capri  October 25, 2017   5:11 PM

## 2017-10-25 NOTE — H&P
Other Topics Concern    Not on file     Social History Narrative    No narrative on file     Family History   Problem Relation Age of Onset    Heart Disease Father      refused pacemaker    High Blood Pressure Father     High Blood Pressure Mother     Other Mother      DVT    High Blood Pressure Sister          PHYSICAL EXAM:      BP (!) 148/97   Pulse 86   Temp 98.3 °F (36.8 °C) (Oral)   Resp 16   Wt (!) 387 lb 14.4 oz (176 kg)   SpO2 97%   BMI 49.14 kg/m²  I        Heart:normal    Lungs: normal    Abdomen: normal      ASA Grade:  See anesthesia note      ASSESSMENT AND PLAN:    1. Procedure options, risks and benefits reviewed with patient and expresses understanding.

## 2017-10-25 NOTE — ANESTHESIA PRE-OP
Department of Anesthesiology  Preprocedure Note       Name:  Rita Schumacher   Age:  74 y.o.  :  1950                                          MRN:  [id number]         Date:  10/25/2017      Surgeon:    Elizabeth Cason Department of Anesthesiology  Pre-Anesthesia Evaluation/Consultation       Name:  Rita Schumacher                                         Age:  74 y.o.   MRN:  [id number]           Procedure (Scheduled):  colonoscopy  Surgeon:  Dr. Ulloa Argue Methotrexate Anaphylaxis    Morphine Shortness Of Breath     Occurred at [facility] - unsure if related to morphine or COPD no hives or rash    Iodinated Diagnostic Agents Other (See Comments)     IV dye - sweating, shakiness    Methadone Other (See Comments)     hallucinations    Sodium Iodide Rash     Patient Active Problem List   Diagnosis    Obesity, Class III, BMI 40-49.9 (morbid obesity) ([facility])    Low back pain    History of total hip replacement    Bilateral primary osteoarthritis of knee    A-fib ([facility])    History of open heart surgery    Sleep apnea    Sarcoidosis ([facility])    1st degree AV block    CAFL (chronic airflow limitation) ([facility])    DDD (degenerative disc disease), lumbar    Dislocation of hip joint prosthesis ([facility])    Gastric atony    Acid reflux    Hyperlipidemia    BP (high blood pressure)    Hypertensive heart disease with heart failure ([facility])    Chronic venous insufficiency     Past Medical History:   Diagnosis Date    [facility]     AICD (automatic cardioverter/defibrillator) present 2017    [facility]--    SUHA (acute kidney injury) ([facility]     Anesthesia     pt states blood pressure drops, he stated that he gets dizzy    Arthritis     CHF (congestive heart failure) ([facility])     COPD (chronic obstructive pulmonary disease) ([facility])     GERD (gastroesophageal reflux disease)     Hyperlipidemia     Hypertension     MOSES (obstructive sleep apnea)     uses bi-pap    Pulmonary hypertensive venous disease     PVD (peripheral vascular disease) ([facility]     Right ventricular enlargement     Sarcoidosis ([facility]     Syncope      Past Surgical History:   Procedure Laterality Date    ABLATION OF DYSRHYTHMIC FOCUS      APPENDECTOMY      CARDIAC CATHETERIZATION      ENDOSCOPY, COLON, DIAGNOSTIC      HIP ARTHROPLASTY Bilateral     bilatera total hip replacement    LYMPH NODE BIOPSY      right inguinal    NASAL FRACTURE SURGERY      OTHER SURGICAL HISTORY Left 12/18/2015    I & D Left hip hematoma    SINUS SURGERY      TONSILLECTOMY       Social History   Substance Use Topics    Smoking status: Former Smoker     Packs/day: 0.25     Years: 1.00     Types: Cigarettes     Quit date: 5/12/1975    Smokeless tobacco: Former User    Alcohol use No     Medications  Current Outpatient Prescriptions on File Prior to Encounter   Medication Sig Dispense Refill    amitriptyline (ELAVIL) 50 MG tablet Take by mouth      atorvastatin (LIPITOR) 40 MG tablet TAKE 1 TABLET (40 MG TOTAL) BY MOUTH DAILY TO CONTROL CHOLESTEROL      DEXILANT 60 MG CPDR delayed release capsule TAKE 1 CAPSULE (60 MG TOTAL) BY MOUTH DAILY. 5    diclofenac sodium (VOLTAREN) 1 % GEL Apply 4 g topically 4 times daily 1 Tube 3    albuterol (PROVENTIL) (2.5 MG/3ML) 0.083% nebulizer solution       fluticasone (FLONASE) 50 MCG/ACT nasal spray       ranitidine (ZANTAC) 300 MG tablet TAKE 1 TABLET BY MOUTH NIGHTLY 30 tablet 3    nitroGLYCERIN (NITROSTAT) 0.3 MG SL tablet Place 0.3 mg under the tongue as needed      azaTHIOprine (IMURAN) 50 MG tablet Take 50 mg by mouth      oxyCODONE-acetaminophen (PERCOCET)  MG per tablet Take 1 tablet by mouth every 4 hours as needed  .       furosemide (LASIX) 20 MG tablet Take 1 tablet by mouth daily (Patient taking differently: Take 20 mg by mouth nightly ) 60 tablet 5    fluticasone-salmeterol (ADVAIR DISKUS) 250-50 MCG/DOSE AEPB Inhale 1 puff into the lungs every 12 hours 1 Inhaler 3    tiotropium (SPIRIVA HANDIHALER) 18 MCG inhalation capsule Inhale 18 mcg into the lungs daily       albuterol sulfate HFA (PROAIR HFA) 108 (90 BASE) MCG/ACT inhaler Inhale 2 puffs into the lungs every 6 hours as needed for Wheezing With spacer 1 Inhaler 3    montelukast (SINGULAIR) 10 MG tablet Take 1 tablet by mouth nightly 30 tablet 3    warfarin (COUMADIN) 10 MG tablet Take 10 mg by mouth Indications: Sunday, Tuesday, Wednesday, Friday and Saturday       warfarin (COUMADIN) 7.5 MG tablet Take 7.5 mg by mouth Indications: Monday  and Thursday       ipratropium-albuterol (DUONEB) 0.5-2.5 (3) MG/3ML SOLN nebulizer solution Inhale 1 vial into the lungs every 6 hours as needed for Shortness of Breath       No current facility-administered medications on file prior to encounter. Current Outpatient Prescriptions   Medication Sig Dispense Refill    amitriptyline (ELAVIL) 50 MG tablet Take by mouth      atorvastatin (LIPITOR) 40 MG tablet TAKE 1 TABLET (40 MG TOTAL) BY MOUTH DAILY TO CONTROL CHOLESTEROL      DEXILANT 60 MG CPDR delayed release capsule TAKE 1 CAPSULE (60 MG TOTAL) BY MOUTH DAILY. 5    diclofenac sodium (VOLTAREN) 1 % GEL Apply 4 g topically 4 times daily 1 Tube 3    albuterol (PROVENTIL) (2.5 MG/3ML) 0.083% nebulizer solution       fluticasone (FLONASE) 50 MCG/ACT nasal spray       ranitidine (ZANTAC) 300 MG tablet TAKE 1 TABLET BY MOUTH NIGHTLY 30 tablet 3    nitroGLYCERIN (NITROSTAT) 0.3 MG SL tablet Place 0.3 mg under the tongue as needed      azaTHIOprine (IMURAN) 50 MG tablet Take 50 mg by mouth      oxyCODONE-acetaminophen (PERCOCET)  MG per tablet Take 1 tablet by mouth every 4 hours as needed  .       furosemide (LASIX) 20 MG tablet Take 1 tablet by mouth daily (Patient taking differently: Take 20 mg by mouth nightly ) 60 tablet 5    fluticasone-salmeterol (ADVAIR DISKUS) 250-50 MCG/DOSE AEPB Inhale 1 puff into the lungs every 12 hours 1 Inhaler 3  tiotropium (SPIRIVA HANDIHALER) 18 MCG inhalation capsule Inhale 18 mcg into the lungs daily       albuterol sulfate HFA (PROAIR HFA) 108 (90 BASE) MCG/ACT inhaler Inhale 2 puffs into the lungs every 6 hours as needed for Wheezing With spacer 1 Inhaler 3    montelukast (SINGULAIR) 10 MG tablet Take 1 tablet by mouth nightly 30 tablet 3    warfarin (COUMADIN) 10 MG tablet Take 10 mg by mouth Indications: Sunday, Tuesday, Wednesday, Friday and Saturday       warfarin (COUMADIN) 7.5 MG tablet Take 7.5 mg by mouth Indications: Monday  and Thursday       ipratropium-albuterol (DUONEB) 0.5-2.5 (3) MG/3ML SOLN nebulizer solution Inhale 1 vial into the lungs every 6 hours as needed for Shortness of Breath       Current Facility-Administered Medications   Medication Dose Route Frequency Provider Last Rate Last Dose    sodium chloride flush 0.9 % injection 10 mL  10 mL Intravenous 2 times per day Martita Carbajal MD        sodium chloride flush 0.9 % injection 10 mL  10 mL Intravenous PRN Martita Carbajal MD        0.9 % sodium chloride infusion   Intravenous Continuous Martita Carbajal MD         Vital Signs (Current) There were no vitals filed for this visit. Vital Signs Statistics (for past 48 hrs)     No Data Recorded    BP Readings from Last 3 Encounters:   10/03/17 (!) 140/80   09/26/17 (!) 140/80   09/19/17 (!) 149/81     BMI  There is no height or weight on file to calculate BMI. Estimated body mass index is 48.39 kg/m² as calculated from the following:    Height as of 10/3/17: 6' 2.5\" (1.892 m). Weight as of 10/3/17: 382 lb (173.3 kg).     CBC   Lab Results   Component Value Date    WBC 7.8 09/27/2017    RBC 4.63 09/27/2017    HGB 13.1 09/27/2017    HCT 41.1 09/27/2017    MCV 88.7 09/27/2017    RDW 15.1 09/27/2017     09/27/2017     CMP    Lab Results   Component Value Date     07/19/2017    K 3.8 07/19/2017     07/19/2017    CO2 27 07/19/2017    BUN 15 07/19/2017 CREATININE 1.1 07/19/2017    GFRAA >60 07/19/2017    AGRATIO 1.3 05/11/2017    LABGLOM >60 07/19/2017    GLUCOSE 98 07/19/2017    PROT 7.6 05/11/2017    CALCIUM 9.2 07/19/2017    BILITOT 0.9 05/11/2017    ALKPHOS 105 05/11/2017    AST 28 05/11/2017    ALT 40 05/11/2017     BMP    Lab Results   Component Value Date     07/19/2017    K 3.8 07/19/2017     07/19/2017    CO2 27 07/19/2017    BUN 15 07/19/2017    CREATININE 1.1 07/19/2017    CALCIUM 9.2 07/19/2017    GFRAA >60 07/19/2017    LABGLOM >60 07/19/2017    GLUCOSE 98 07/19/2017     POCGlucose  No results for input(s): GLUCOSE in the last 72 hours. Coags    Lab Results   Component Value Date    PROTIME 32.4 06/15/2017    INR 1.7 10/03/2017    APTT 60.0 33/96/3588     HCG (If Applicable) No results found for: PREGTESTUR, PREGSERUM, HCG, HCGQUANT   ABGs   Lab Results   Component Value Date    PO2ART 33 06/15/2017    UZV9HZD 48 06/15/2017    DHD3OLA 31 06/15/2017    BEART 5.9 06/15/2017      Type & Screen (If Applicable)  No results found for: LABABO, LABRH      Procedure:    Medications prior to admission:   Prior to Admission medications    Medication Sig Start Date End Date Taking? Authorizing Provider   amitriptyline (ELAVIL) 50 MG tablet Take by mouth 8/11/17   Historical Provider, MD   atorvastatin (LIPITOR) 40 MG tablet TAKE 1 TABLET (40 MG TOTAL) BY MOUTH DAILY TO CONTROL CHOLESTEROL 6/1/17   Historical Provider, MD   DEXILANT 60 MG CPDR delayed release capsule TAKE 1 CAPSULE (60 MG TOTAL) BY MOUTH DAILY.  9/21/17   Historical Provider, MD   diclofenac sodium (VOLTAREN) 1 % GEL Apply 4 g topically 4 times daily 10/3/17 11/2/17  Libby Guevara MD   albuterol (PROVENTIL) (2.5 MG/3ML) 0.083% nebulizer solution  8/2/17   Historical Provider, MD   fluticasone (FLONASE) 50 MCG/ACT nasal spray  9/11/17   Historical Provider, MD   ranitidine (ZANTAC) 300 MG tablet TAKE 1 TABLET BY MOUTH NIGHTLY 9/14/17   Libby Guevara MD   nitroGLYCERIN (NITROSTAT) 0.3 MG SL tablet Place 0.3 mg under the tongue as needed 8/15/17   Historical Provider, MD   azaTHIOprine (IMURAN) 50 MG tablet Take 50 mg by mouth 8/9/17   Historical Provider, MD   oxyCODONE-acetaminophen (PERCOCET)  MG per tablet Take 1 tablet by mouth every 4 hours as needed  . 6/1/17   Historical Provider, MD   furosemide (LASIX) 20 MG tablet Take 1 tablet by mouth daily  Patient taking differently: Take 20 mg by mouth nightly  5/26/17   Ben Covington MD   fluticasone-salmeterol (ADVAIR DISKUS) 250-50 MCG/DOSE AEPB Inhale 1 puff into the lungs every 12 hours 4/11/17   Brittany Liang CNP   tiotropium (SPIRIVA HANDIHALER) 18 MCG inhalation capsule Inhale 18 mcg into the lungs daily  3/16/17   Historical Provider, MD   albuterol sulfate HFA (PROAIR HFA) 108 (90 BASE) MCG/ACT inhaler Inhale 2 puffs into the lungs every 6 hours as needed for Wheezing With spacer 3/10/17   Shyanne Callahan MD   montelukast (SINGULAIR) 10 MG tablet Take 1 tablet by mouth nightly 3/10/17   Shyanne Callahan MD   warfarin (COUMADIN) 10 MG tablet Take 10 mg by mouth Indications: Sunday, Tuesday, Wednesday, Friday and Saturday     Historical Provider, MD   warfarin (COUMADIN) 7.5 MG tablet Take 7.5 mg by mouth Indications: Monday  and Thursday     Historical Provider, MD   ipratropium-albuterol (DUONEB) 0.5-2.5 (3) MG/3ML SOLN nebulizer solution Inhale 1 vial into the lungs every 6 hours as needed for Shortness of Breath    Historical Provider, MD       Current medications:    Current Outpatient Prescriptions   Medication Sig Dispense Refill    amitriptyline (ELAVIL) 50 MG tablet Take by mouth      atorvastatin (LIPITOR) 40 MG tablet TAKE 1 TABLET (40 MG TOTAL) BY MOUTH DAILY TO CONTROL CHOLESTEROL      DEXILANT 60 MG CPDR delayed release capsule TAKE 1 CAPSULE (60 MG TOTAL) BY MOUTH DAILY.   5    diclofenac sodium (VOLTAREN) 1 % GEL Apply 4 g topically 4 times daily 1 Tube 3    albuterol Occurred at [facility] - unsure if related to morphine or COPD no hives or rash    Iodinated Diagnostic Agents Other (See Comments)     IV dye - sweating, shakiness    Methadone Other (See Comments)     hallucinations    Sodium Iodide Rash       Problem List:    Patient Active Problem List   Diagnosis Code    Obesity, Class III, BMI 40-49.9 (morbid obesity) ([facility]) E66.01    Low back pain M54.5    History of total hip replacement Z96.649    Bilateral primary osteoarthritis of knee M17.0    A-fib ([facility]) I48.91    History of open heart surgery Z98.890    Sleep apnea G47.30    Sarcoidosis ([facility]) D86.9    1st degree AV block I44.0    CAFL (chronic airflow limitation) ([facility]) J44.9    DDD (degenerative disc disease), lumbar M51.36    Dislocation of hip joint prosthesis ([facility]) T84.029A, Z96.649    Gastric atony K31.84    Acid reflux K21.9    Hyperlipidemia E78.5    BP (high blood pressure) I10    Hypertensive heart disease with heart failure ([facility]) I11.0    Chronic venous insufficiency I87.2       Past Medical History:        Diagnosis Date    [facility] ([facility] [address]     AICD (automatic cardioverter/defibrillator) present 07/13/2017    [facility]--    SUHA (acute kidney injury) ([facility] [address]     Anesthesia     pt states blood pressure drops, he stated that he gets dizzy    Arthritis     CHF (congestive heart failure) ([facility])     COPD (chronic obstructive pulmonary disease) ([facility]     GERD (gastroesophageal reflux disease)     Hyperlipidemia     Hypertension     MOSES (obstructive sleep apnea)     uses bi-pap    Pulmonary hypertensive venous disease     PVD (peripheral vascular disease) ([facility])     Right ventricular enlargement     Sarcoidosis ([facility])     Syncope        Past Surgical History:        Procedure Laterality Date    ABLATION OF DYSRHYTHMIC FOCUS      APPENDECTOMY      CARDIAC CATHETERIZATION      ENDOSCOPY, COLON, DIAGNOSTIC      HIP ARTHROPLASTY Bilateral     bilatera total hip replacement    ago): :. (-) hypothyroidism, hyperthyroidism, arthritis, no Type I DM                  Comments: Sarcoidosis  Gout   Abdominal:   (+) obese,         Vascular:                                      Anesthesia Plan      MAC     ASA 3     (Wants extremely light anesthesia, wants to remain awake)  Induction: intravenous. Anesthetic plan and risks discussed with patient. Plan discussed with CRNA. Brady Pedraza MD   10/25/2017    This pre-anesthesia assessment may be used as a history and physical.    DOS STAFF ADDENDUM:    Pt seen and examined, chart reviewed (including anesthesia, drug and allergy history). No interval changes to history and physical examination. Anesthetic plan, risks, benefits, alternatives, and personnel involved discussed with patient. Patient verbalized an understanding and agrees to proceed.       Brady Pedraza MD  October 25, 2017  1:36 PM

## 2017-11-28 DIAGNOSIS — Z87.39 HISTORY OF BURNING PAIN IN LEG: ICD-10-CM

## 2017-11-28 RX ORDER — POTASSIUM CHLORIDE 1500 MG/1
TABLET, EXTENDED RELEASE ORAL
Qty: 30 TABLET | Refills: 5 | Status: SHIPPED | OUTPATIENT
Start: 2017-11-28 | End: 2018-03-12

## 2017-12-08 RX ORDER — AMITRIPTYLINE HYDROCHLORIDE 50 MG/1
50 TABLET, FILM COATED ORAL NIGHTLY
Qty: 30 TABLET | Refills: 0 | Status: SHIPPED | OUTPATIENT
Start: 2017-12-08

## 2017-12-10 DIAGNOSIS — Z96.643 S/P HIP REPLACEMENT, BILATERAL: ICD-10-CM

## 2017-12-10 DIAGNOSIS — M25.50 ARTHRALGIA, UNSPECIFIED JOINT: ICD-10-CM

## 2017-12-11 RX ORDER — RANITIDINE 300 MG/1
300 TABLET ORAL NIGHTLY
Qty: 30 TABLET | Refills: 3 | Status: SHIPPED | OUTPATIENT
Start: 2017-12-11 | End: 2018-03-12

## 2017-12-20 DIAGNOSIS — J44.1 CHRONIC OBSTRUCTIVE PULMONARY DISEASE WITH ACUTE EXACERBATION (HCC): ICD-10-CM

## 2017-12-20 RX ORDER — MONTELUKAST SODIUM 10 MG/1
10 TABLET ORAL NIGHTLY
Qty: 30 TABLET | Refills: 3 | Status: SHIPPED | OUTPATIENT
Start: 2017-12-20

## 2018-03-12 ENCOUNTER — OFFICE VISIT (OUTPATIENT)
Dept: PRIMARY CARE CLINIC | Age: 68
End: 2018-03-12

## 2018-03-12 VITALS
SYSTOLIC BLOOD PRESSURE: 116 MMHG | BODY MASS INDEX: 39.17 KG/M2 | HEIGHT: 75 IN | TEMPERATURE: 97.2 F | HEART RATE: 64 BPM | OXYGEN SATURATION: 96 % | DIASTOLIC BLOOD PRESSURE: 79 MMHG | WEIGHT: 315 LBS

## 2018-03-12 DIAGNOSIS — R79.0 ABNORMAL BLOOD LEVEL OF COBALT: Primary | ICD-10-CM

## 2018-03-12 PROCEDURE — 99999 PR OFFICE/OUTPT VISIT,PROCEDURE ONLY: CPT | Performed by: INTERNAL MEDICINE

## 2018-03-12 RX ORDER — TORSEMIDE 20 MG/1
20 TABLET ORAL 2 TIMES DAILY
Qty: 60 TABLET | Refills: 3 | Status: SHIPPED | OUTPATIENT
Start: 2018-03-12

## 2018-03-12 ASSESSMENT — PATIENT HEALTH QUESTIONNAIRE - PHQ9
2. FEELING DOWN, DEPRESSED OR HOPELESS: 0
SUM OF ALL RESPONSES TO PHQ9 QUESTIONS 1 & 2: 0
1. LITTLE INTEREST OR PLEASURE IN DOING THINGS: 0
SUM OF ALL RESPONSES TO PHQ QUESTIONS 1-9: 0

## 2018-03-12 ASSESSMENT — ENCOUNTER SYMPTOMS
GASTROINTESTINAL NEGATIVE: 1
CONSTIPATION: 0
BLOOD IN STOOL: 0
WHEEZING: 0
CHEST TIGHTNESS: 0
EYES NEGATIVE: 1
DIARRHEA: 0
BACK PAIN: 1

## 2018-03-12 NOTE — PROGRESS NOTES
each day  Stay current on medication refills  If taking OTC's check with MD/pharmacy first about interactions    LDL goal 262 or less  Systolic BP < or equal to 930  Diastolic BP < or equal to 85    Advised  Flu and Pneumonia Vax  Set targets for weight loss 4 lbs per month  Exercise 3-5 times per week as tolerated   Keep check of weight  Weighting machine    On a scale of 1 to 5 how confident are you in these goals?   3/5    Education materials given

## 2018-03-14 LAB — MISCELLANEOUS LAB TEST ORDER: ABNORMAL

## 2018-04-25 ENCOUNTER — TELEPHONE (OUTPATIENT)
Dept: ADMINISTRATIVE | Age: 68
End: 2018-04-25

## 2018-04-26 ENCOUNTER — OFFICE VISIT (OUTPATIENT)
Dept: PRIMARY CARE CLINIC | Age: 68
End: 2018-04-26

## 2018-04-26 VITALS
WEIGHT: 315 LBS | TEMPERATURE: 98.9 F | DIASTOLIC BLOOD PRESSURE: 77 MMHG | BODY MASS INDEX: 39.17 KG/M2 | HEART RATE: 70 BPM | HEIGHT: 75 IN | SYSTOLIC BLOOD PRESSURE: 121 MMHG | OXYGEN SATURATION: 97 %

## 2018-04-26 DIAGNOSIS — H53.8 BLURRED VISION, RIGHT EYE: ICD-10-CM

## 2018-04-26 DIAGNOSIS — J34.89 NOSTRIL SORE: ICD-10-CM

## 2018-04-26 DIAGNOSIS — H57.11 EYE PAIN, RIGHT: ICD-10-CM

## 2018-04-26 DIAGNOSIS — R73.09 ABNORMAL GLUCOSE: ICD-10-CM

## 2018-04-26 DIAGNOSIS — I10 ESSENTIAL HYPERTENSION: Primary | ICD-10-CM

## 2018-04-26 DIAGNOSIS — Z13.1 SCREENING FOR DIABETES MELLITUS (DM): ICD-10-CM

## 2018-04-26 LAB
GLUCOSE BLD-MCNC: 92 MG/DL
HBA1C MFR BLD: 6 %

## 2018-04-26 PROCEDURE — G8427 DOCREV CUR MEDS BY ELIG CLIN: HCPCS | Performed by: INTERNAL MEDICINE

## 2018-04-26 PROCEDURE — 3017F COLORECTAL CA SCREEN DOC REV: CPT | Performed by: INTERNAL MEDICINE

## 2018-04-26 PROCEDURE — 99214 OFFICE O/P EST MOD 30 MIN: CPT | Performed by: INTERNAL MEDICINE

## 2018-04-26 PROCEDURE — 83036 HEMOGLOBIN GLYCOSYLATED A1C: CPT | Performed by: INTERNAL MEDICINE

## 2018-04-26 PROCEDURE — 4040F PNEUMOC VAC/ADMIN/RCVD: CPT | Performed by: INTERNAL MEDICINE

## 2018-04-26 PROCEDURE — 1123F ACP DISCUSS/DSCN MKR DOCD: CPT | Performed by: INTERNAL MEDICINE

## 2018-04-26 PROCEDURE — 82962 GLUCOSE BLOOD TEST: CPT | Performed by: INTERNAL MEDICINE

## 2018-04-26 PROCEDURE — 1036F TOBACCO NON-USER: CPT | Performed by: INTERNAL MEDICINE

## 2018-04-26 PROCEDURE — G8417 CALC BMI ABV UP PARAM F/U: HCPCS | Performed by: INTERNAL MEDICINE

## 2018-04-26 ASSESSMENT — ENCOUNTER SYMPTOMS
NAUSEA: 0
EYE PAIN: 1
DOUBLE VISION: 0
PHOTOPHOBIA: 0
BLURRED VISION: 1
VOMITING: 0
FOREIGN BODY SENSATION: 0
EYE ITCHING: 0
RESPIRATORY NEGATIVE: 1
EYE DISCHARGE: 1
EYE REDNESS: 1

## 2018-06-27 ENCOUNTER — OFFICE VISIT (OUTPATIENT)
Dept: PRIMARY CARE CLINIC | Age: 68
End: 2018-06-27

## 2018-06-27 VITALS
HEIGHT: 75 IN | BODY MASS INDEX: 39.17 KG/M2 | TEMPERATURE: 97.1 F | WEIGHT: 315 LBS | SYSTOLIC BLOOD PRESSURE: 132 MMHG | DIASTOLIC BLOOD PRESSURE: 79 MMHG | HEART RATE: 64 BPM | OXYGEN SATURATION: 99 %

## 2018-06-27 DIAGNOSIS — Z11.59 ENCOUNTER FOR HEPATITIS C SCREENING TEST FOR LOW RISK PATIENT: ICD-10-CM

## 2018-06-27 DIAGNOSIS — M16.11 PRIMARY OSTEOARTHRITIS OF RIGHT HIP: ICD-10-CM

## 2018-06-27 DIAGNOSIS — Z12.11 SCREEN FOR COLON CANCER: ICD-10-CM

## 2018-06-27 PROCEDURE — 1036F TOBACCO NON-USER: CPT | Performed by: INTERNAL MEDICINE

## 2018-06-27 PROCEDURE — G8427 DOCREV CUR MEDS BY ELIG CLIN: HCPCS | Performed by: INTERNAL MEDICINE

## 2018-06-27 PROCEDURE — G8417 CALC BMI ABV UP PARAM F/U: HCPCS | Performed by: INTERNAL MEDICINE

## 2018-06-27 PROCEDURE — 1123F ACP DISCUSS/DSCN MKR DOCD: CPT | Performed by: INTERNAL MEDICINE

## 2018-06-27 PROCEDURE — 4040F PNEUMOC VAC/ADMIN/RCVD: CPT | Performed by: INTERNAL MEDICINE

## 2018-06-27 PROCEDURE — 3017F COLORECTAL CA SCREEN DOC REV: CPT | Performed by: INTERNAL MEDICINE

## 2018-06-27 PROCEDURE — 99213 OFFICE O/P EST LOW 20 MIN: CPT | Performed by: INTERNAL MEDICINE

## 2018-06-27 ASSESSMENT — ENCOUNTER SYMPTOMS
BLOOD IN STOOL: 0
BACK PAIN: 1
CONSTIPATION: 0
GASTROINTESTINAL NEGATIVE: 1
EYES NEGATIVE: 1
WHEEZING: 0
CHEST TIGHTNESS: 0
DIARRHEA: 0

## 2018-07-19 ENCOUNTER — OFFICE VISIT (OUTPATIENT)
Dept: ORTHOPEDIC SURGERY | Age: 68
End: 2018-07-19

## 2018-07-19 VITALS
HEIGHT: 75 IN | DIASTOLIC BLOOD PRESSURE: 76 MMHG | WEIGHT: 315 LBS | BODY MASS INDEX: 39.17 KG/M2 | TEMPERATURE: 97.3 F | HEART RATE: 74 BPM | RESPIRATION RATE: 16 BRPM | SYSTOLIC BLOOD PRESSURE: 125 MMHG

## 2018-07-19 DIAGNOSIS — Z96.643 HISTORY OF TOTAL REPLACEMENT OF BOTH HIP JOINTS: Primary | ICD-10-CM

## 2018-07-19 PROCEDURE — 1101F PT FALLS ASSESS-DOCD LE1/YR: CPT | Performed by: ORTHOPAEDIC SURGERY

## 2018-07-19 PROCEDURE — 1123F ACP DISCUSS/DSCN MKR DOCD: CPT | Performed by: ORTHOPAEDIC SURGERY

## 2018-07-19 PROCEDURE — 1036F TOBACCO NON-USER: CPT | Performed by: ORTHOPAEDIC SURGERY

## 2018-07-19 PROCEDURE — 99213 OFFICE O/P EST LOW 20 MIN: CPT | Performed by: ORTHOPAEDIC SURGERY

## 2018-07-19 PROCEDURE — 4040F PNEUMOC VAC/ADMIN/RCVD: CPT | Performed by: ORTHOPAEDIC SURGERY

## 2018-07-19 PROCEDURE — G8417 CALC BMI ABV UP PARAM F/U: HCPCS | Performed by: ORTHOPAEDIC SURGERY

## 2018-07-19 PROCEDURE — G8427 DOCREV CUR MEDS BY ELIG CLIN: HCPCS | Performed by: ORTHOPAEDIC SURGERY

## 2018-07-19 PROCEDURE — 3017F COLORECTAL CA SCREEN DOC REV: CPT | Performed by: ORTHOPAEDIC SURGERY

## 2018-07-19 NOTE — PROGRESS NOTES
This dictation was done with Dragon dictation and may contain mechanical errors related to translation. Blood pressure 125/76, pulse 74, temperature 97.3 °F (36.3 °C), resp. rate 16, height 6' 3\" (1.905 m), weight (!) 372 lb (168.7 kg).

## 2018-07-21 NOTE — PROGRESS NOTES
Patient is a 79-year-old male presents as a new patient for evaluation treatment of right hip pain. Patient has a very significant past surgical history of both right and left hips. Patient states that he is status post index right total hip arthroplasty performed in 2010. Patient is also had hip arthroplasty on the contralateral side in 2011. Both of these surgical procedures were performed by other orthopedic surgeons and myself. Patient is doing well on the left side and has little to no complaints at this point in time. Patient tells me that the left hip which is the stablone today has been operated on again for dislocation after the index procedure. Evidently this secondary procedure was effective and he has no hip pain on the left side that he's complaining about today. The right hip replacement was performed by yet another surgeon and there was no other secondary operations as far as I can see or the patient can tell me. Patient complains of anterior aching groin pain difficulty ambulation. Unfortunately this patient appears to be moving around multiple orthopedic doctors as he was seen at the [facility] orthopedic clinic and under their direction underwent a hip aspiration performed sometime in early July 2018. At that time a Gram stain was shown no organisms seen. There were rare mononuclear cells seen. Fluid cell count of that specimen showed 3000 nucleated cells. There was no significant left shift within these nucleated cells. Cultures of the fluid at this point time are no growth and I don't have any other further information about his right hip aspiration results. Patient appears to be here for some type of second opinion.     Patient Active Problem List   Diagnosis    Low back pain    History of total hip replacement    Bilateral primary osteoarthritis of knee    [facility] ([facility] [address]    History of open heart surgery    Sleep apnea    Sarcoidosis ([facility] [address] [facility]    1st degree AV block    [facility]    DDD (degenerative disc disease), lumbar    Dislocation of hip joint prosthesis (HCC)    Gastric atony    Acid reflux    Hyperlipidemia    Hypertensive heart disease with heart failure (HCC)    Chronic venous insufficiency    Blurred vision, right eye    Eye pain, right    Essential hypertension     Prior to Visit Medications    Medication Sig Taking? Authorizing Provider   torsemide (DEMADEX) 20 MG tablet Take 1 tablet by mouth 2 times daily  Monique Pham MD   montelukast (SINGULAIR) 10 MG tablet TAKE 1 TABLET BY MOUTH NIGHTLY  Monique Pham MD   amitriptyline (ELAVIL) 50 MG tablet TAKE 1 TABLET BY MOUTH NIGHTLY  Monique Pham MD   atorvastatin (LIPITOR) 40 MG tablet TAKE 1 TABLET (40 MG TOTAL) BY MOUTH DAILY TO CONTROL CHOLESTEROL  Historical Provider, MD   DEXILANT 60 MG CPDR delayed release capsule TAKE 1 CAPSULE (60 MG TOTAL) BY MOUTH DAILY. Historical Provider, MD   albuterol (PROVENTIL) (2.5 MG/3ML) 0.083% nebulizer solution   Historical Provider, MD   fluticasone (FLONASE) 50 MCG/ACT nasal spray   Historical Provider, MD   nitroGLYCERIN (NITROSTAT) 0.3 MG SL tablet Place 0.3 mg under the tongue as needed  Historical Provider, MD   oxyCODONE-acetaminophen (PERCOCET)  MG per tablet Take 1 tablet by mouth every 4 hours as needed  .   Historical Provider, MD   fluticasone-salmeterol (ADVAIR DISKUS) 250-50 MCG/DOSE AEPB Inhale 1 puff into the lungs every 12 hours  LEAH Helms - CNP   tiotropium (SPIRIVA HANDIHALER) 18 MCG inhalation capsule Inhale 18 mcg into the lungs daily   Historical Provider, MD   albuterol sulfate HFA (PROAIR HFA) 108 (90 BASE) MCG/ACT inhaler Inhale 2 puffs into the lungs every 6 hours as needed for Wheezing With spacer  Monique Pham MD   warfarin (COUMADIN) 10 MG tablet Take 10 mg by mouth Indications: Sunday, Tuesday, Wednesday, Friday and Saturday   Historical Provider, MD   warfarin (COUMADIN) 7.5 MG tablet may have been some subsidence is the calcar at least on the AP view is superior to the superior sam of the ingrowth area on this implant. Otherwise the implant appears to be stable with no signs of further loosening or subsidence. No other obvious fractures tumors or dislocations are noted on these x-rays. Impression 61-year-old morbidly obese male with a significant vascular disease status post bilateral total hip arthroplasties left side is doing well right side is having pain aspiration is as described above. At this point time the patient is afebrile he does have a positive indication of possible hip infection related to his 3000 white blood cells in his hip aspirate. Plan we had a 30 minute face-to-face discussion with this patient of which greater than 50% of the time was spent in counseling him concerning further evaluation and care of his total hip arthroplasty. I believe he needs to reconnect with the surgeon's at [address] [place] [address] for further evaluation and possible treatment of his hip condition. This patient is a miserable surgical candidate secondary to his multiple other diseases. At this point in time I believe anything should be done to prevent this gentleman from undergoing any type of revision hip arthroplasty or aggressive septic surgical workup of his right hip. And I would again recommend a follow-up with the [facility] or whoever the ordering physician was of the hip aspirate.

## 2018-08-01 DIAGNOSIS — R52 PAIN: Primary | ICD-10-CM

## 2018-12-17 ENCOUNTER — TELEPHONE (OUTPATIENT)
Dept: PRIMARY CARE CLINIC | Age: 68
End: 2018-12-17

## 2018-12-24 ENCOUNTER — TELEPHONE (OUTPATIENT)
Dept: PRIMARY CARE CLINIC | Age: 68
End: 2018-12-24

## 2018-12-25 NOTE — TELEPHONE ENCOUNTER
Give patient appointment with any MD Wednesday to discuss and further evaluate. Needs evaluation before levels can be drawn to know what is appropriate.

## 2019-01-10 ENCOUNTER — OFFICE VISIT (OUTPATIENT)
Dept: PRIMARY CARE CLINIC | Age: 69
End: 2019-01-10
Payer: MEDICARE

## 2019-01-10 VITALS
HEART RATE: 69 BPM | HEIGHT: 75 IN | OXYGEN SATURATION: 97 % | DIASTOLIC BLOOD PRESSURE: 80 MMHG | SYSTOLIC BLOOD PRESSURE: 130 MMHG | TEMPERATURE: 97.6 F | BODY MASS INDEX: 39.17 KG/M2 | WEIGHT: 315 LBS

## 2019-01-10 DIAGNOSIS — R06.02 SOB (SHORTNESS OF BREATH): ICD-10-CM

## 2019-01-10 DIAGNOSIS — Z23 NEED FOR PNEUMOCOCCAL VACCINE: ICD-10-CM

## 2019-01-10 DIAGNOSIS — R63.8 WEIGHT DISORDER: ICD-10-CM

## 2019-01-10 PROCEDURE — 4040F PNEUMOC VAC/ADMIN/RCVD: CPT | Performed by: INTERNAL MEDICINE

## 2019-01-10 PROCEDURE — G8417 CALC BMI ABV UP PARAM F/U: HCPCS | Performed by: INTERNAL MEDICINE

## 2019-01-10 PROCEDURE — 1036F TOBACCO NON-USER: CPT | Performed by: INTERNAL MEDICINE

## 2019-01-10 PROCEDURE — G8482 FLU IMMUNIZE ORDER/ADMIN: HCPCS | Performed by: INTERNAL MEDICINE

## 2019-01-10 PROCEDURE — 3017F COLORECTAL CA SCREEN DOC REV: CPT | Performed by: INTERNAL MEDICINE

## 2019-01-10 PROCEDURE — 1101F PT FALLS ASSESS-DOCD LE1/YR: CPT | Performed by: INTERNAL MEDICINE

## 2019-01-10 PROCEDURE — 94640 AIRWAY INHALATION TREATMENT: CPT | Performed by: INTERNAL MEDICINE

## 2019-01-10 PROCEDURE — 90670 PCV13 VACCINE IM: CPT | Performed by: INTERNAL MEDICINE

## 2019-01-10 PROCEDURE — 1123F ACP DISCUSS/DSCN MKR DOCD: CPT | Performed by: INTERNAL MEDICINE

## 2019-01-10 PROCEDURE — 99213 OFFICE O/P EST LOW 20 MIN: CPT | Performed by: INTERNAL MEDICINE

## 2019-01-10 PROCEDURE — G0009 ADMIN PNEUMOCOCCAL VACCINE: HCPCS | Performed by: INTERNAL MEDICINE

## 2019-01-10 PROCEDURE — G8427 DOCREV CUR MEDS BY ELIG CLIN: HCPCS | Performed by: INTERNAL MEDICINE

## 2019-01-10 ASSESSMENT — ENCOUNTER SYMPTOMS
EYES NEGATIVE: 1
BACK PAIN: 1
DIARRHEA: 0
GASTROINTESTINAL NEGATIVE: 1
CHEST TIGHTNESS: 0
WHEEZING: 0
CONSTIPATION: 0
BLOOD IN STOOL: 0

## 2019-01-29 RX ORDER — WARFARIN SODIUM 5 MG/1
TABLET ORAL
Qty: 180 TABLET | Refills: 2 | Status: SHIPPED | OUTPATIENT
Start: 2019-01-29

## 2019-02-28 ENCOUNTER — OFFICE VISIT (OUTPATIENT)
Dept: ORTHOPEDIC SURGERY | Age: 69
End: 2019-02-28
Payer: MEDICARE

## 2019-02-28 VITALS
HEIGHT: 74 IN | BODY MASS INDEX: 40.43 KG/M2 | DIASTOLIC BLOOD PRESSURE: 68 MMHG | SYSTOLIC BLOOD PRESSURE: 122 MMHG | HEART RATE: 61 BPM | WEIGHT: 315 LBS

## 2019-02-28 DIAGNOSIS — M17.0 BILATERAL PRIMARY OSTEOARTHRITIS OF KNEE: ICD-10-CM

## 2019-02-28 DIAGNOSIS — M25.561 RIGHT KNEE PAIN, UNSPECIFIED CHRONICITY: Primary | ICD-10-CM

## 2019-02-28 DIAGNOSIS — M25.562 LEFT KNEE PAIN, UNSPECIFIED CHRONICITY: ICD-10-CM

## 2019-02-28 PROCEDURE — 3017F COLORECTAL CA SCREEN DOC REV: CPT | Performed by: ORTHOPAEDIC SURGERY

## 2019-02-28 PROCEDURE — 4040F PNEUMOC VAC/ADMIN/RCVD: CPT | Performed by: ORTHOPAEDIC SURGERY

## 2019-02-28 PROCEDURE — 99214 OFFICE O/P EST MOD 30 MIN: CPT | Performed by: ORTHOPAEDIC SURGERY

## 2019-02-28 PROCEDURE — 1101F PT FALLS ASSESS-DOCD LE1/YR: CPT | Performed by: ORTHOPAEDIC SURGERY

## 2019-02-28 PROCEDURE — G8417 CALC BMI ABV UP PARAM F/U: HCPCS | Performed by: ORTHOPAEDIC SURGERY

## 2019-02-28 PROCEDURE — G8427 DOCREV CUR MEDS BY ELIG CLIN: HCPCS | Performed by: ORTHOPAEDIC SURGERY

## 2019-02-28 PROCEDURE — 20610 DRAIN/INJ JOINT/BURSA W/O US: CPT | Performed by: ORTHOPAEDIC SURGERY

## 2019-02-28 PROCEDURE — 1123F ACP DISCUSS/DSCN MKR DOCD: CPT | Performed by: ORTHOPAEDIC SURGERY

## 2019-02-28 PROCEDURE — 1036F TOBACCO NON-USER: CPT | Performed by: ORTHOPAEDIC SURGERY

## 2019-02-28 PROCEDURE — G8482 FLU IMMUNIZE ORDER/ADMIN: HCPCS | Performed by: ORTHOPAEDIC SURGERY

## 2019-02-28 ASSESSMENT — ENCOUNTER SYMPTOMS: SHORTNESS OF BREATH: 1

## 2019-03-07 ENCOUNTER — TELEPHONE (OUTPATIENT)
Dept: ORTHOPEDIC SURGERY | Age: 69
End: 2019-03-07

## 2019-03-14 ENCOUNTER — OFFICE VISIT (OUTPATIENT)
Dept: ORTHOPEDIC SURGERY | Age: 69
End: 2019-03-14
Payer: MEDICARE

## 2019-03-14 ENCOUNTER — TELEPHONE (OUTPATIENT)
Dept: BARIATRICS/WEIGHT MGMT | Age: 69
End: 2019-03-14

## 2019-03-14 VITALS
HEART RATE: 59 BPM | HEIGHT: 74 IN | SYSTOLIC BLOOD PRESSURE: 122 MMHG | WEIGHT: 315 LBS | DIASTOLIC BLOOD PRESSURE: 77 MMHG | BODY MASS INDEX: 40.43 KG/M2

## 2019-03-14 DIAGNOSIS — M25.562 LEFT KNEE PAIN, UNSPECIFIED CHRONICITY: ICD-10-CM

## 2019-03-14 DIAGNOSIS — M25.561 RIGHT KNEE PAIN, UNSPECIFIED CHRONICITY: ICD-10-CM

## 2019-03-14 DIAGNOSIS — M17.0 BILATERAL PRIMARY OSTEOARTHRITIS OF KNEE: Primary | ICD-10-CM

## 2019-03-14 PROCEDURE — 99214 OFFICE O/P EST MOD 30 MIN: CPT | Performed by: PHYSICIAN ASSISTANT

## 2019-03-14 PROCEDURE — 20610 DRAIN/INJ JOINT/BURSA W/O US: CPT | Performed by: PHYSICIAN ASSISTANT

## 2019-03-21 ENCOUNTER — OFFICE VISIT (OUTPATIENT)
Dept: ORTHOPEDIC SURGERY | Age: 69
End: 2019-03-21
Payer: MEDICARE

## 2019-03-21 VITALS
DIASTOLIC BLOOD PRESSURE: 68 MMHG | HEART RATE: 64 BPM | WEIGHT: 315 LBS | SYSTOLIC BLOOD PRESSURE: 108 MMHG | BODY MASS INDEX: 40.43 KG/M2 | HEIGHT: 74 IN

## 2019-03-21 DIAGNOSIS — M25.561 RIGHT KNEE PAIN, UNSPECIFIED CHRONICITY: ICD-10-CM

## 2019-03-21 DIAGNOSIS — M25.562 LEFT KNEE PAIN, UNSPECIFIED CHRONICITY: ICD-10-CM

## 2019-03-21 DIAGNOSIS — M17.0 BILATERAL PRIMARY OSTEOARTHRITIS OF KNEE: Primary | ICD-10-CM

## 2019-03-21 PROCEDURE — 99024 POSTOP FOLLOW-UP VISIT: CPT | Performed by: PHYSICIAN ASSISTANT

## 2019-03-21 PROCEDURE — 20610 DRAIN/INJ JOINT/BURSA W/O US: CPT | Performed by: PHYSICIAN ASSISTANT

## 2019-03-28 ENCOUNTER — OFFICE VISIT (OUTPATIENT)
Dept: ORTHOPEDIC SURGERY | Age: 69
End: 2019-03-28
Payer: MEDICARE

## 2019-03-28 VITALS
HEIGHT: 74 IN | DIASTOLIC BLOOD PRESSURE: 68 MMHG | BODY MASS INDEX: 40.43 KG/M2 | WEIGHT: 315 LBS | HEART RATE: 66 BPM | SYSTOLIC BLOOD PRESSURE: 112 MMHG

## 2019-03-28 DIAGNOSIS — M25.561 RIGHT KNEE PAIN, UNSPECIFIED CHRONICITY: ICD-10-CM

## 2019-03-28 DIAGNOSIS — M17.0 BILATERAL PRIMARY OSTEOARTHRITIS OF KNEE: Primary | ICD-10-CM

## 2019-03-28 DIAGNOSIS — M25.562 LEFT KNEE PAIN, UNSPECIFIED CHRONICITY: ICD-10-CM

## 2019-03-28 PROCEDURE — 20610 DRAIN/INJ JOINT/BURSA W/O US: CPT | Performed by: ORTHOPAEDIC SURGERY

## 2019-03-28 PROCEDURE — 99024 POSTOP FOLLOW-UP VISIT: CPT | Performed by: ORTHOPAEDIC SURGERY

## 2019-04-01 ENCOUNTER — TELEPHONE (OUTPATIENT)
Dept: ORTHOPEDIC SURGERY | Age: 69
End: 2019-04-01

## 2019-04-01 DIAGNOSIS — M17.0 BILATERAL PRIMARY OSTEOARTHRITIS OF KNEE: Primary | ICD-10-CM

## 2019-04-03 ENCOUNTER — TELEPHONE (OUTPATIENT)
Dept: BARIATRICS/WEIGHT MGMT | Age: 69
End: 2019-04-03

## 2019-04-03 NOTE — TELEPHONE ENCOUNTER
Patient attended a seminar 3/21/19 with Dr Triston Luong. He DOES have BWLS coverage with Medicare (No Req Diet)  BMI Form scanned in media. *Spoke with pt, info given.  Pt states he is not interested in surgical program. Per pt request, he was placed on waiting list for [facility] program. Thanks

## 2019-04-08 ENCOUNTER — HOSPITAL ENCOUNTER (OUTPATIENT)
Dept: PHYSICAL THERAPY | Age: 69
Setting detail: THERAPIES SERIES
Discharge: HOME | End: 2019-04-08
Payer: MEDICARE

## 2019-04-08 PROCEDURE — 97110 THERAPEUTIC EXERCISES: CPT

## 2019-04-08 PROCEDURE — 97530 THERAPEUTIC ACTIVITIES: CPT

## 2019-04-08 PROCEDURE — 97162 PT EVAL MOD COMPLEX 30 MIN: CPT

## 2019-04-08 ASSESSMENT — PAIN DESCRIPTION - FREQUENCY: FREQUENCY: CONTINUOUS

## 2019-04-08 ASSESSMENT — PAIN DESCRIPTION - ORIENTATION: ORIENTATION: RIGHT;LEFT

## 2019-04-08 ASSESSMENT — PAIN DESCRIPTION - LOCATION: LOCATION: KNEE

## 2019-04-08 ASSESSMENT — PAIN DESCRIPTION - DESCRIPTORS: DESCRIPTORS: ACHING

## 2019-04-08 ASSESSMENT — PAIN DESCRIPTION - PAIN TYPE: TYPE: CHRONIC PAIN

## 2019-04-08 ASSESSMENT — PAIN SCALES - GENERAL: PAINLEVEL_OUTOF10: 9

## 2019-04-08 NOTE — FLOWSHEET NOTE
Physical Therapy Daily/Aquatic Flow Sheet   Date:  2019    Patient Name:  Donald Davidson    :  1950  MRN: [id number]  Restrictions/Precautions:  Pt in red for aquatic visits 2* size, reassess   Medical/Treatment Diagnosis Information:   · Diagnosis: Bilateral knee OA M17.0   · Treatment Diagnosis: Severe hip/knee strength deficits, impaired balance, decreased knee/hip ROM and stability     Tracking Information:  Physician Information Referring Practitioner: MIGUELINA Richard     Plan of Care Sent Date:  Signed Received:    Visit Count / Total Visits      Insurance Approved Visits  /  Approved Dates:     Insurance Information PT Insurance Information: [address], 100% by insurance, med necessity     Progress Note/G-codes   []  Yes  [x]  No Next Due: 10th visit (land)      Pain level: 8-9/10 constant    Subjective:  SEE EVAL    Objective:   Observation: SEE EVAL   Test measurements:    Land-based Therapy Dates of Service:    Land-based Visits Exercises/Activities:  Exercise/Equipment Resistance/Repetitions Other comments                                                                              AquaticTherapy Dates of Service:   Aquatic Visits Exercises/Activities:   Transfers:          % Immersion:            Ambulation:   UE Exercises:       Forwards  X Shoulder Shrugs      Lateral   X Shoulder Circles      Retro   X Scapular Retraction       Marching   Push Downs       Cariocas   Punching     Jog    Rowing     Multifidi walkouts with paddle   Elbow Flex/Ext       Shldr Flex/Ext       Shldr aBd/aDd    LE Exercises:  Shldr Horiz aBd/aDd    HR/TR X Shldr IR/ER    Marches X Arm Circles    Squats   PNF Diagonals    Hamstring Curls X Wall Push Ups    Hip Flexion (SLR)  Figure 8's    Hip aBduction (SLR) X Bilateral Pull Downs    Hip Extension (SLR) X      Hip aDduction (SLR) X     Hip Circles X Functional:    Hip IR/Er  Step up forward    TrA Set   Step up lateral     Pelvic Tilts Step down     Fig 8's   Lunges Forward    LE PNF  Lunges Retro      Lunges Lateral     Balance:   Lower ab curl with noodle     SLS        Tandem Stance X      NBOS eyes open  Seated:     NBOS eyes closed  Ankle pumps     Hand to Opposite Knee X Ankle Circles     Fwd Step ups to SLS  Knee Flex/Ext X   Lateral Step ups to SLS  Hip aBd/aDd    Stop/Go Gait   Bicycle       Ankle DF/PF      Ankle Inv/Ev    Stretching:       Gastroc/Soleus X     Hamstring  X Noodle:     Knee Flex Stretch X Leg Press    Piriformis   Noodle Hang at [address] [place]    Hip Flexor  Noodle Hang Deep Water    SKTC  Noodle Bicycle     DKTC       ITB      Quad  Deep Water:    Mid Back   Jog    UT  Jumping Jacks    Post Shoulder  Heel to Toe    Ladder Pull  Hand to Opposite Knee    Pec Stretch  Rocking Horse      [employer] Skier          Cervical:   Other:     AROM Flexion      AROM Extension      AROM Side Bending      AROM Rotation      Chin Tucks      Chin Nods        Aquatic Abbreviation Key  B= Belt DB= Dumbells T= Theratube   H= Hydrotone N= Noodles W= Weights   P= Paddles S= Speedo equipment K= Kickboard     Other Therapeutic Activities:  Pt was educated on aquatic therapy intervention and POC as well as taken on tour of pool area in which pt was shown family changing rooms, how to utilize lockers what to wear for sessions as well as emphasized treatments take place in group setting. Pt was also educated on process of checking in at  and reporting back to pool area taking care with transition to pool area due to wet floors. Pt was also issued pool handout which outlines process, reiterates cancellation/no show policy as well as further instructions on accessing lockers.       Home Exercise Program:      Manual Treatments:      Modalities:      Timed Code Treatment Minutes:  15    Total Treatment Minutes:  45    Treatment/Activity Tolerance:  [] Patient tolerated treatment well [] Patient limited by fatigue  [x] Patient limited by pain  []

## 2019-04-08 NOTE — PROGRESS NOTES
Physical Therapy  Initial Assessment  Date: 2019  Patient Name: Heide Solis  MRN: [id number]  : 1950     Treatment Diagnosis: Severe hip/knee strength deficits, impaired balance, decreased knee/hip ROM and stability     Restrictions    Outpatient fall risk assessment completed asking screening question if patient has fallen in the past 30 days:  [x] Yes  [] No    Based on screen for falls, patient demonstrates fall risk:  [x] Yes  [] No    Interventions based on fall risk status:  Updated Problem List within Medical History  [x] Yes   [] N/A    Asked family to assist with increased observation of the patient  [x] Yes   [] N/A    Patient kept in visible area when not closely supervised by therapist  [x] Yes   [] N/A    Repeatedly reinforce activity limits and safety needs with patient/family  [x] Yes   [] N/A    Increase frequency of rounding/monitoring patient  [x] Yes   [] N/A        Subjective  General  Chart Reviewed: Yes  Patient assessed for rehabilitation services?: Yes  Additional Pertinent Hx: Cardiac Sarcoidosis, Insomnia, Chronic diastolic heart failure, sarcoidosis, bilateral hip arthritis. Multiple surgeries for hip replacements (Dr Tommie Palomo), COPD, PVD, A-Fib   Family / Caregiver Present: Yes  Referring Practitioner: MIGUELINA Urbina  Diagnosis: Bilateral knee OA M17.0   PT Visit Information  PT Insurance Information: [address], 100% by insurance, med necessity   Subjective  Subjective: CLOF:  Pt referred by Dr Yash Dejesus for chronic bilateral knee pain. Pt was seen by weight management, Dr Karuna Tello, but did not yet quality for a knee replacement because of his weight  Overall pain 9/10. Has received 2 injections over last 3 weeks (Euflexxa injection). On chronic steroids for a very long time and as a result has gained a lot of weight. Valgus on right knee, varus on left. Difficulty with weightbearing and is on chronic Percocet.  Had hip replacements every year from 6250-0812.  6 on left, 2 on right. Pt ambulates with a SPC.  MD wants him to use a walker, but he chooses not to. Pt has had a history of ulcers on his feet. Pt is not able to do a lot of daily activities 2* pain. Pt is able to stand for maximum of 10 mins. Pt wants to be able to help reduce his pain some. Says that both knees are \"shot, bone on bone. \" PLOF:  Sedentary lifestyle, can do light housework. Home: lives alone, 13 steps to get into home, but can stay with his girlfriend, who only has 1+1 step to enter home.  Pain: 8-9/10 bilateral knees and hips Goals: stop some of the pain   Pain Screening  Patient Currently in Pain: Yes  Pain Assessment  Pain Assessment: 0-10  Pain Level: 9  Pain Type: Chronic pain  Pain Location: Knee  Pain Orientation: Right;Left  Pain Descriptors: Aching  Pain Frequency: Continuous  Vital Signs  Patient Currently in Pain: Yes      Social/Functional History  Social/Functional History  Lives With: Alone(but also stays with girlfriend )  Type of Home: House  Home Layout: Two level(13 steps to house/apartment )  Home Equipment: Rolling walker;Cane  ADL Assistance: Independent  Homemaking Assistance: Independent  Ambulation Assistance: Independent  Transfer Assistance: Independent  Additional Comments: Pt also stays with patient, especially many nights, so he can avoid steps to get to a bedroom/bathroom    Objective    AROM RLE (degrees)  RLE General AROM: Knee 0-105*  AROM LLE (degrees)  LLE General AROM: Knee:  2-90*    Strength RLE  Comment: Hip Flex 4-/5, Hip Abd 2+/5, Knee Flex 3+/5, Knee Ext 3+/5  Strength LLE  Comment: Hip Flex 3-/5, Hip Abd 2-/5, Knee Flex 3/5, Knee Ext 3/5    Additional Measures  Other: 30 sec sit to stand: 4 reps   Balance  Tandem Stance R Le  Tandem Stance L Le  Single Leg Stance R Le  Single Leg Stance L Le    Assessment  Conditions Requiring Skilled Therapeutic Intervention  Body structures, Functions, Activity limitations: Decreased stand test to 7 reps in order to help reduce fall risk and improve CKC stability   Patient Goals   Patient goals : reduce pain       Therapy Time   Individual Concurrent Group Co-treatment   Time In  1445         Time Out  1530         Minutes [address], [place]

## 2019-04-08 NOTE — PLAN OF CARE
Outpatient Physical Therapy     Phone: [phone] Fax: [phone]     To: Referring Practitioner: MIGUELINA Ratliff      Patient: Lori Muñoz   : 1950   MRN: [id number]  Evaluation Date: 2019      Diagnosis Information:  · Diagnosis: Bilateral knee OA M17.0    · Treatment Diagnosis: Severe hip/knee strength deficits, impaired balance, decreased knee/hip ROM and stability      Physical Therapy Certification Form  Dear Jordi Regan,  The following patient has been evaluated for physical therapy services. Please review the attached evaluation and/or summary of the patient's plan of care, and verify that you agree therapy should continue by signing the attached document and sending it back to our office. Plan of Care/Treatment to date:  [] Therapeutic Exercise      [x] Modalities:  [] Therapeutic Activity        [] Ultrasound    [] Gait Training        [] Cervical Traction   [] Neuromuscular Re-education      [] Cold/hotpack    [x] Instruction in HEP        [] Lumbar Traction  [] Manual Therapy        [] Electrical Stimulation            [x] Aquatic Therapy        [] Iontophoresis        ? [] Lymphedema management  [] Women's Health     Other:  [] Vestibular Rehab        []    []  Needed     Frequency/Duration:  # Days per week: [] 1 day # Weeks: [] 1 week [] 5 weeks     [x] 2 days? [] 2 weeks [x] 6 weeks     [] 3 days   [] 3 weeks [] 7 weeks     [] 4 days   [] 4 weeks [] 8 weeks    Rehab Potential: [] Excellent [] Good [x] Fair  [] Poor     Electronically signed by:  Joseph Márquez PT    If you have any questions or concerns, please don't hesitate to call.   Thank you for your referral.      Physician Signature:________________________________Date:__________________  By signing above, therapists plan is approved by physician

## 2019-04-12 ENCOUNTER — HOSPITAL ENCOUNTER (OUTPATIENT)
Dept: PHYSICAL THERAPY | Age: 69
Setting detail: THERAPIES SERIES
Discharge: HOME | End: 2019-04-12
Payer: MEDICARE

## 2019-04-12 PROCEDURE — 97140 MANUAL THERAPY 1/> REGIONS: CPT

## 2019-04-12 PROCEDURE — 97110 THERAPEUTIC EXERCISES: CPT

## 2019-04-12 NOTE — FLOWSHEET NOTE
Physical Therapy Daily/Aquatic Flow Sheet   Date:  2019    Patient Name:  Miles Izaguirre    :  1950  MRN: [id number]  Restrictions/Precautions:  Pt in red for aquatic visits 2* size, reassess   Medical/Treatment Diagnosis Information:   · Diagnosis: Bilateral knee OA M17.0   · Treatment Diagnosis: Severe hip/knee strength deficits, impaired balance, decreased knee/hip ROM and stability     Tracking Information:  Physician Information Referring Practitioner: MIGUELINA Mckenna     Plan of Care Sent Date:  Signed Received:    Visit Count / Total Visits      Insurance Approved Visits  /  Approved Dates:     Insurance Information PT Insurance Information: [address], 100% by insurance, med necessity     Progress Note/G-codes   []  Yes  [x]  No Next Due: 10th visit (land)      Pain level:   8-9/10 constant  Subjective: Patient states his knees/hips are in constant pain    Objective:   Observation:   Test measurements:    Land-based Therapy Dates of Service:    Land-based Visits Exercises/Activities:  Exercise/Equipment Resistance/Repetitions Other comments                                                                            RED WITH PT AIDE FOR  [address]; exited via chair due to increased pain and fatigue   AquaticTherapy Dates of Service: ; Aquatic Visits Exercises/Activities:   Transfers:          % Immersion:            Ambulation:   UE Exercises:       Forwards  X1 lap with noodle Shoulder Shrugs      Lateral   X1 lap with noodle  Shoulder Circles      Retro   X unable Scapular Retraction       Marching   Push Downs       Cariocas   Punching     Jog    Rowing     Multifidi walkouts with paddle   Elbow Flex/Ext       Shldr Flex/Ext       Shldr aBd/aDd    LE Exercises:  Shldr Horiz aBd/aDd    HR/TR X8 Shldr IR/ER    Marches X8 Arm Circles    Squats  8 PNF Diagonals    Hamstring Curls X8 Wall Push Ups    Hip Flexion (SLR)  Figure 8's    Hip aBduction (SLR) X5 L, 8 right Bilateral Pull Downs    Hip Extension (SLR) X5 L, , 8 right       Hip aDduction (SLR) X     Hip Circles X Functional:    Hip IR/Er  Step up forward    TrA Set   Step up lateral     Pelvic Tilts   Step down     Fig 8's   Lunges Forward    LE PNF  Lunges Retro      Lunges Lateral     Balance:   Lower ab curl with noodle     SLS        Tandem Stance       NBOS eyes open  Seated:     NBOS eyes closed  Ankle pumps     Hand to Opposite Knee X Ankle Circles     Fwd Step ups to SLS  Knee Flex/Ext X10 L/R   Lateral Step ups to SLS  Hip aBd/aDd    Stop/Go Gait   Bicycle       Ankle DF/PF      Ankle Inv/Ev    Stretching:       Gastroc/Soleus X     Hamstring  X30\" X2 L/R Noodle:     Knee Flex Stretch X Leg Press    Piriformis   Noodle Hang at [place] [place]    Hip Flexor  Noodle Hang Deep Water    SKTC  Noodle Bicycle     DKTC       ITB      Quad  Deep Water:    Mid Back   Jog    UT  Jumping Jacks    Post Shoulder  Heel to Toe    Ladder Pull  Hand to Opposite Knee    Pec Stretch  Rocking Horse      [employer] Skier          Cervical:   Other:     AROM Flexion      AROM Extension      AROM Side Bending      AROM Rotation      Chin Tucks      Chin Nods        Aquatic Abbreviation Key  B= Belt DB= Dumbells T= Theratube   H= Hydrotone N= Noodles W= Weights   P= Paddles S= Speedo equipment K= Kickboard     Other Therapeutic Activities:  Pt was educated on aquatic therapy intervention and POC as well as taken on tour of pool area in which pt was shown family changing rooms, how to utilize lockers what to wear for sessions as well as emphasized treatments take place in group setting. Pt was also educated on process of checking in at  and reporting back to pool area taking care with transition to pool area due to wet floors. Pt was also issued pool handout which outlines process, reiterates cancellation/no show policy as well as further instructions on accessing lockers.       Home Exercise Program:      Manual Treatments:      Modalities:      Timed Code Treatment Minutes:  20    Total Treatment Minutes:  38    Treatment/Activity Tolerance:  [] Patient tolerated treatment well [] Patient limited by fatigue  [x] Patient limited by pain  [] Patient limited by other medical complications  [x] Other: exited via chair due to increased pain and fatigue; per PT jessica MACIAS knee giving way in pool 4/12      Prognosis: [] Good [x] Fair  [] Poor    Patient Requires Follow-up: [x] Yes  [] No    Plan:   [x] Continue per plan of care [] Alter current plan (see comments)  [] Plan of care initiated [] Hold pending MD visit [] Discharge  Plan for Next Session:   Aquatic therapy     Electronically signed by:  Eloina Samuels PT

## 2019-04-15 ENCOUNTER — HOSPITAL ENCOUNTER (OUTPATIENT)
Dept: PHYSICAL THERAPY | Age: 69
Setting detail: THERAPIES SERIES
Discharge: HOME | End: 2019-04-15
Payer: MEDICARE

## 2019-04-15 PROCEDURE — 97150 GROUP THERAPEUTIC PROCEDURES: CPT

## 2019-04-15 PROCEDURE — 97113 AQUATIC THERAPY/EXERCISES: CPT

## 2019-04-15 NOTE — FLOWSHEET NOTE
Physical Therapy Daily/Aquatic Flow Sheet   Date:  4/15/2019    Patient Name:  Dejuan Colon    :  1950  MRN: [id number]  Restrictions/Precautions:  Pt in red for aquatic visits 2* size, reassess   Medical/Treatment Diagnosis Information:   · Diagnosis: Bilateral knee OA M17.0   · Treatment Diagnosis: Severe hip/knee strength deficits, impaired balance, decreased knee/hip ROM and stability     Tracking Information:  Physician Information Referring Practitioner: MIGUELINA Cosby     Plan of Care Sent Date:  Signed Received:    Visit Count / Total Visits  3/12    Insurance Approved Visits  /  Approved Dates:     Insurance Information PT Insurance Information: [address], 100% by insurance, med necessity     Progress Note/G-codes   []  Yes  [x]  No Next Due: 10th visit (land)      Pain level:   8/10 constant  Subjective: Patient states his knees/hips are in constant pain, needed several minutes after getting to the pool to catch his breathe. Objective:   Observation:  Worked with Aide during session, frequent leg buckling during session. May need to be one on one with therapist  Test measurements:    Land-based Therapy Dates of Service:    Land-based Visits Exercises/Activities:  Exercise/Equipment Resistance/Repetitions Other comments                                                                            RED WITH PT AIDE FOR  [address]; exited via chair due to increased pain and fatigue , 4/15,  AquaticTherapy Dates of Service: ; 4/15,  Aquatic Visits Exercises/Activities:   Transfers:          % Immersion:            Ambulation:   UE Exercises:       Forwards  X1 lap with noodle Shoulder Shrugs      Lateral   X1 lap with noodle  Shoulder Circles      Retro   X unable Scapular Retraction       Marching   Push Downs       Cariocas   Punching     Jog    Rowing     Multifidi walkouts with paddle   Elbow Flex/Ext       Shldr Flex/Ext       Shldr aBd/aDd    LE Exercises:  Shldr Horiz aBd/aDd    HR/TR Jonas Ramus IR/ER    Marches X8 Arm Circles    Squats  8 PNF Diagonals    Hamstring Curls X8 Wall Push Ups    Hip Flexion (SLR)  Figure 8's    Hip aBduction (SLR) X5 L, 8 right Bilateral Pull Downs    Hip Extension (SLR) X5 L, , 8 right       Hip aDduction (SLR) X5     Hip Circles X 5 Functional:    Hip IR/Er  Step up forward    TrA Set   Step up lateral     Pelvic Tilts   Step down     Fig 8's   Lunges Forward    LE PNF  Lunges Retro      Lunges Lateral     Balance:   Lower ab curl with noodle     SLS        Tandem Stance       NBOS eyes open  Seated:     NBOS eyes closed  Ankle pumps     Hand to Opposite Knee X Ankle Circles     Fwd Step ups to SLS  Knee Flex/Ext X10 L/R   Lateral Step ups to SLS  Hip aBd/aDd    Stop/Go Gait   Bicycle       Ankle DF/PF      Ankle Inv/Ev    Stretching:       Gastroc/Soleus X     Hamstring  X30\" X2 L/R Noodle:     Knee Flex Stretch X Leg Press    Piriformis   Noodle Hang at [facility]    Hip Flexor  Noodle Hang Deep Water    SKTC  Noodle Bicycle     DKTC       ITB      Quad  Deep Water:    Mid Back   Jog    UT  Jumping Jacks    Post Shoulder  Heel to Toe    Ladder Pull  Hand to Opposite Knee    Pec Stretch  Rocking Horse      [employer] Skier          Cervical:   Other:     AROM Flexion      AROM Extension      AROM Side Bending      AROM Rotation      Chin Tucks      Chin Nods        Aquatic Abbreviation Key  B= Belt DB= Dumbells T= Theratube   H= Hydrotone N= Noodles W= Weights   P= Paddles S= Speedo equipment K= Kickboard     Other Therapeutic Activities:  Pt was educated on aquatic therapy intervention and POC as well as taken on tour of pool area in which pt was shown family changing rooms, how to utilize lockers what to wear for sessions as well as emphasized treatments take place in group setting.  Pt was also educated on process of checking in at  and reporting back to pool area taking care with transition to pool area due to wet floors. Pt was also issued pool handout which outlines process, reiterates cancellation/no show policy as well as further instructions on accessing lockers.       Home Exercise Program:      Manual Treatments:      Modalities:      Timed Code Treatment Minutes:  15    Total Treatment Minutes:  45    Treatment/Activity Tolerance:  [] Patient tolerated treatment well [] Patient limited by fatigue  [x] Patient limited by pain  [] Patient limited by other medical complications  [x] Other: exited via chair due to increased pain and fatigue; per PT jessica MACIAS knee giving way in pool 4/15, 4/12      Prognosis: [] Good [x] Fair  [] Poor    Patient Requires Follow-up: [x] Yes  [] No    Plan:   [x] Continue per plan of care [] Alter current plan (see comments)  [] Plan of care initiated [] Hold pending MD visit [] Discharge  Plan for Next Session:   Aquatic therapy     Electronically signed by:  Nazario Shea PT

## 2019-04-22 ENCOUNTER — HOSPITAL ENCOUNTER (OUTPATIENT)
Dept: PHYSICAL THERAPY | Age: 69
Setting detail: THERAPIES SERIES
Discharge: HOME | End: 2019-04-22
Payer: MEDICARE

## 2019-04-22 PROCEDURE — 97113 AQUATIC THERAPY/EXERCISES: CPT

## 2019-04-22 PROCEDURE — 97150 GROUP THERAPEUTIC PROCEDURES: CPT

## 2019-04-26 ENCOUNTER — HOSPITAL ENCOUNTER (OUTPATIENT)
Dept: PHYSICAL THERAPY | Age: 69
Setting detail: THERAPIES SERIES
Discharge: HOME | End: 2019-04-26
Payer: MEDICARE

## 2019-04-26 PROCEDURE — 97150 GROUP THERAPEUTIC PROCEDURES: CPT

## 2019-04-26 PROCEDURE — 97113 AQUATIC THERAPY/EXERCISES: CPT

## 2019-04-26 NOTE — FLOWSHEET NOTE
Nods        Aquatic Abbreviation Key  B= Belt DB= Dumbells T= Theratube   H= Hydrotone N= Noodles W= Weights   P= Paddles S= Speedo equipment K= Kickboard     Other Therapeutic Activities:  Pt was educated on aquatic therapy intervention and POC as well as taken on tour of pool area in which pt was shown family changing rooms, how to utilize lockers what to wear for sessions as well as emphasized treatments take place in group setting. Pt was also educated on process of checking in at  and reporting back to pool area taking care with transition to pool area due to wet floors. Pt was also issued pool handout which outlines process, reiterates cancellation/no show policy as well as further instructions on accessing lockers.       Home Exercise Program:      Manual Treatments:      Modalities:      Timed Code Treatment Minutes:  13    Total Treatment Minutes:  45 (SAT ON LEDGE AND THEN CHAIR FOR LAST 15 MINUTES WHILE PT TRIED TO FIND A TRANSPORT CHAIR)    Treatment/Activity Tolerance:  [] Patient tolerated treatment well [] Patient limited by fatigue  [] Patient limited by pain  [] Patient limited by other medical complications  [x] Other: Pt with improved tolerance and 2 episodes of knee buckling exited via chair due to increased pain and fatigue; per PT aide L knee giving way in pool 4/26, 4/15, 4/12; spoke with evaluatiing PT in regards to patient likely needing one on one PT on aquatics if he is going to continue in aquatic PT      Prognosis: [] Good [x] Fair  [] Poor    Patient Requires Follow-up: [x] Yes  [] No    Plan:   [x] Continue per plan of care [] Alter current plan (see comments)  [] Plan of care initiated [] Hold pending MD visit [] Discharge  Plan for Next Session:   Aquatic therapy     Electronically signed by:  Margie Grayson, PT

## 2019-05-02 ENCOUNTER — OFFICE VISIT (OUTPATIENT)
Dept: ORTHOPEDIC SURGERY | Age: 69
End: 2019-05-02
Payer: MEDICARE

## 2019-05-02 VITALS
HEART RATE: 88 BPM | SYSTOLIC BLOOD PRESSURE: 108 MMHG | BODY MASS INDEX: 40.43 KG/M2 | DIASTOLIC BLOOD PRESSURE: 68 MMHG | HEIGHT: 74 IN | WEIGHT: 315 LBS

## 2019-05-02 DIAGNOSIS — M25.562 LEFT KNEE PAIN, UNSPECIFIED CHRONICITY: ICD-10-CM

## 2019-05-02 DIAGNOSIS — M17.0 BILATERAL PRIMARY OSTEOARTHRITIS OF KNEE: Primary | ICD-10-CM

## 2019-05-02 DIAGNOSIS — M25.561 RIGHT KNEE PAIN, UNSPECIFIED CHRONICITY: ICD-10-CM

## 2019-05-02 PROCEDURE — G8417 CALC BMI ABV UP PARAM F/U: HCPCS | Performed by: ORTHOPAEDIC SURGERY

## 2019-05-02 PROCEDURE — G8427 DOCREV CUR MEDS BY ELIG CLIN: HCPCS | Performed by: ORTHOPAEDIC SURGERY

## 2019-05-02 PROCEDURE — 1123F ACP DISCUSS/DSCN MKR DOCD: CPT | Performed by: ORTHOPAEDIC SURGERY

## 2019-05-02 PROCEDURE — 99214 OFFICE O/P EST MOD 30 MIN: CPT | Performed by: ORTHOPAEDIC SURGERY

## 2019-05-02 PROCEDURE — 3017F COLORECTAL CA SCREEN DOC REV: CPT | Performed by: ORTHOPAEDIC SURGERY

## 2019-05-02 PROCEDURE — 4040F PNEUMOC VAC/ADMIN/RCVD: CPT | Performed by: ORTHOPAEDIC SURGERY

## 2019-05-02 PROCEDURE — 1036F TOBACCO NON-USER: CPT | Performed by: ORTHOPAEDIC SURGERY

## 2019-05-02 PROCEDURE — 20610 DRAIN/INJ JOINT/BURSA W/O US: CPT | Performed by: ORTHOPAEDIC SURGERY

## 2019-05-02 NOTE — PROGRESS NOTES
[facility]  Follow Up Knee Pain      Chief Complaint    Knee Pain (Bilateral knee)      History of Present Illness:  Currently   Only limited benefit sfrom the injection of eufflexxa. Has been considering the gastric bypass but continues to loose on his own. Knee pain more marked over the past several months. Some give way and near falls. Hip pain relatively constant. Limited amounts of activity. : 3/21/19  patient is here for second Euflexxa injection. Patient notes he has noticed a little improvement with his overall knee pain states that his overall pain is still 9 out of 10 but the knees are little bit better today. He states that he is was seen by weight management but did not qualify for the surgery he is pending medical evaluations Hopefully he can begin to start a weight loss regimen and gets with the weight off as instructed by Dr. Carly Youssef      Previously :3/14/19  patient is here for his first Euflexxa injection. Patient states that he has started the CBD oil and has noticed limited to no effects as of yet he notes his only been on for 2 days. States that he is scheduled for the epidural injection but cannot remember the date specifically. States that his knee pain today is a 9 out of 10 on bilateral knees. Patient noticed limited to no effect with the corticosteroid injections to bilateral knees from previous visit. Previously: 2/28/19  Brent Jorge is a 81y.o. year old male who presents for evaluation of bilateral knee pain. Patient has a history of sarcoidosis and was on chronic steroids for a very long time and as result had gained tremendous amount of weight and was as high as 371 pounds. At present he has a BMI of 47.63 and has severe primary osteoarthritis of both knees with valgus angulation on the right and varus on the left.   He has difficulty with weightbearing and has results is on chronic Percocet 10/325 milligrams tablets. He denies any acute injury. He has a history of bilateral hip arthritis and has had multiple surgeries for his hip replacements the most recent of which was performed by  Dr Chuckie Ochoa. Belk[place] January He feels that the hips are improved overall but walking is more limtied through the knees. He is tried anti-inflammatory medications as well as corticosteroid injections but is not tried hyaluronic acid injections.           Past Medical History:   Diagnosis Date    A-fib [facility])     AICD (automatic cardioverter/defibrillator) present 2017    [facility]--[employer]--    [facility]     Anesthesia     pt states blood pressure drops, he stated that he gets dizzy    Arthritis     CH[facility]estive [facility] failure) [facility]     [facility]     GERD (gastroesophageal reflux disease)     Hyperlipidemia     Hypertension     MOSES (obstructive sleep apnea)     uses bi-pap    Pulmonary hypertensive venous disease (HCC)     PVD (peripheral vascular disease) ([facility]     Right ventricular enlargement     Sarcoidosis     Syncope       Social History     Socioeconomic History    Marital status: Single     Spouse name: Not on file    Number of children: Not on file    Years of education: Not on file    Highest education level: Not on file   Occupational History    Not on file   Social Needs    Financial resource strain: Not on file    Food insecurity:     Worry: Not on file     Inability: Not on file    Transportation needs:     Medical: Not on file     Non-medical: Not on file   Tobacco Use    Smoking status: Former Smoker     Packs/day: 0.25     Years: 1.00     Pack years: 0.25     Types: Cigarettes     Last attempt to quit: 1975     Years since quittin.0    Smokeless tobacco: Former User   Substance and Sexual Activity    Alcohol use: No    Drug use: No    Sexual activity: Never   Lifestyle    Physical activity:     Days per week: Not on file     Minutes per session: Not on file    Stress: Not on file   Relationships    Social connections:     Talks on phone: Not on file     Gets together: Not on file     Attends Yazidism service: Not on file     Active member of club or organization: Not on file     Attends meetings of clubs or organizations: Not on file     Relationship status: Not on file    Intimate partner violence:     Fear of current or ex partner: Not on file     Emotionally abused: Not on file     Physically abused: Not on file     Forced sexual activity: Not on file   Other Topics Concern    Not on file   Social History Narrative    Not on file     Current Outpatient Medications on File Prior to Visit   Medication Sig Dispense Refill    warfarin (COUMADIN) 5 MG tablet TAKE AS DIRECTED BY ANTI-COAG CLINIC 180 tablet 2    torsemide (DEMADEX) 20 MG tablet Take 1 tablet by mouth 2 times daily 60 tablet 3    montelukast (SINGULAIR) 10 MG tablet TAKE 1 TABLET BY MOUTH NIGHTLY 30 tablet 3    amitriptyline (ELAVIL) 50 MG tablet TAKE 1 TABLET BY MOUTH NIGHTLY 30 tablet 0    atorvastatin (LIPITOR) 40 MG tablet TAKE 1 TABLET (40 MG TOTAL) BY MOUTH DAILY TO CONTROL CHOLESTEROL      DEXILANT 60 MG CPDR delayed release capsule TAKE 1 CAPSULE (60 MG TOTAL) BY MOUTH DAILY. 5    albuterol (PROVENTIL) (2.5 MG/3ML) 0.083% nebulizer solution       fluticasone (FLONASE) 50 MCG/ACT nasal spray       nitroGLYCERIN (NITROSTAT) 0.3 MG SL tablet Place 0.3 mg under the tongue as needed      oxyCODONE-acetaminophen (PERCOCET)  MG per tablet Take 1 tablet by mouth every 4 hours as needed  .       fluticasone-salmeterol (ADVAIR DISKUS) 250-50 MCG/DOSE AEPB Inhale 1 puff into the lungs every 12 hours 1 Inhaler 3    tiotropium (SPIRIVA HANDIHALER) 18 MCG inhalation capsule Inhale 18 mcg into the lungs daily       albuterol sulfate HFA (PROAIR HFA) 108 (90 BASE) MCG/ACT inhaler Inhale 2 puffs into the lungs every 6 hours as needed for Wheezing With spacer 1 Inhaler 3    warfarin (COUMADIN) 10 MG tablet Take 10 mg by mouth Indications: Sunday, Tuesday, Wednesday, Friday and Saturday       warfarin (COUMADIN) 7.5 MG tablet Take 7.5 mg by mouth Indications: Monday  and Thursday       ipratropium-albuterol (DUONEB) 0.5-2.5 (3) MG/3ML SOLN nebulizer solution Inhale 1 vial into the lungs every 6 hours as needed for Shortness of Breath       No current facility-administered medications on file prior to visit. Allergies   Allergen Reactions    Methotrexate Anaphylaxis    Morphine Shortness Of Breath     Occurred at [facility] - unsure if related to morphine or COPD no hives or rash    Iodinated Diagnostic Agents Other (See Comments)     IV dye - sweating, shakiness    Methadone Other (See Comments)     hallucinations    Sodium Iodide Rash       Review of Systems:  A 14 point review of systems available in the scanned medical record as documented by the patient. The review is negative with the exception of those things mentioned in the HPI and Past Medical History. Vital Signs:  Vitals:    05/02/19 0935   BP: 108/68   Pulse: 88         General Exam:     Neuro: Alert & oriented x 3,  normal,  no focal deficits noted. Normal affect. Eyes: sclera clear  Ears: Normal external ear  Lymph: no lymphedema  Pulm: Respirations unlabored and regular  Pulse: Regular rate and rhythm   Skin: Warm, well perfused    Musculoskeletal:    Hips appear to be actuely irritable to testing and rom although able to flexion bilateral to past 100. Internal and external rotation with pain bilaterally at hte anteiro hips. No obviosu swelling and no signs of instbaility bilaterally. Right Knee Exam:   Overall alignment is Valgus     Gait/Alignment: No use of assistive devices.      Inspection/Skin: Skin is intact without erythema or ecchymosis. No significant swelling. No deformity.  He has loss of eccrine sweat glands and a healed ulcer on his medial ankle This Encounter   Procedures    Ambulatory referral to Physical Therapy     Referral Priority:   Routine     Referral Type:   Eval and Treat     Requested Specialty:   Physical Therapy     Number of Visits Requested:   1    HI ARTHROCENTESIS ASPIR&/INJ MAJOR JT/BURSA W/O US    HI TRIAMCINOLONE ACETONIDE INJ    HI ARTHROCENTESIS ASPIR&/INJ MAJOR JT/BURSA W/O US    HI TRIAMCINOLONE ACETONIDE INJ             -Advised for patient to keep activity levels low for today ice the knee for 20 minutes 3 times a day. Injection Procedure Note  Procedure Details     Verbal consent was obtained for the procedure. The left knee(s) was prepped with iodine and the skin was anesthetized. Using a 22 gauge needle the left knee(s) joint is injected with 2 ml 1% lidocaine and 2 ml of triamcinolone (KENALOG) 40mg/ml under the lateral aspect of the knee. The injection site was cleansed with topical isopropyl alcohol and a dressing was applied. Complications:  None; patient tolerated the procedure well. Injection Procedure Note  Procedure Details     Verbal consent was obtained for the procedure. The right knee(s) was prepped with iodine and the skin was anesthetized. Using a 22 gauge needle the right knee(s) joint is injected with 2 ml 1% lidocaine and 2 ml of triamcinolone (KENALOG) 40mg/ml under the lateral aspect of the knee. The injection site was cleansed with topical isopropyl alcohol and a dressing was applied. Complications:  None; patient tolerated the procedure well. I discussed the diagnosis of arthritis with the patient. We've discussed treatment options which would include anti-inflammatory medication, physical therapy, bracing, cortisone injections, and Visco supplementation. We have also discussed the benefits of low impact exercise and weight loss. We have also discussed the possibility of joint replacement surgery in the future.       Can consider bone scan for the bilateral hips but at this point will continue to follow up potential improvement in the knees and the possiblity of bariatirc intervention. Started him on a water therapy program to continue weight loss and exercise options he'll consider using a  as well to try to proceed with more weight loss efforts. Water therapy was diffiuclt overall because of the limited ability for the water therapist to help him one on one. Only getting basic therapy. Start on land therapy to see if some of the sympotms improve.

## 2019-05-03 ENCOUNTER — APPOINTMENT (OUTPATIENT)
Dept: PHYSICAL THERAPY | Age: 69
End: 2019-05-03
Payer: MEDICARE

## 2019-05-06 ENCOUNTER — HOSPITAL ENCOUNTER (OUTPATIENT)
Dept: PHYSICAL THERAPY | Age: 69
Setting detail: THERAPIES SERIES
Discharge: HOME | End: 2019-05-06
Payer: MEDICARE

## 2019-05-06 ENCOUNTER — APPOINTMENT (OUTPATIENT)
Dept: PHYSICAL THERAPY | Age: 69
End: 2019-05-06
Payer: MEDICARE

## 2019-05-06 PROCEDURE — 97110 THERAPEUTIC EXERCISES: CPT

## 2019-05-06 NOTE — FLOWSHEET NOTE
TIME AND PT WILL TALK TO EVALUATING PT TO RECOMMEND PATIENT BEING ONE ON ONE  AquaticTherapy Dates of Service: 4/12; 4/15, 4/22, 4/26  Aquatic Visits Exercises/Activities:   Transfers:          % Immersion:            Ambulation:   UE Exercises:       Forwards X1 1/2 lap to deep water with N and aide min=mod A ; and back to pool entrance at end of session mod A Shoulder Shrugs      Lateral  Shoulder Circles      Retro   X unable Scapular Retraction       Marching   Push Downs       [place]   Punching     Jog    Rowing     Multifidi walkouts with paddle   Elbow Flex/Ext       Shldr Flex/Ext Back at wall X10      Shldr aBd/aDd Back at wall X10   LE Exercises:  Shldr Horiz aBd/aDd Back At wall X10   HR/TR X15 Shldr IR/ER    Marches X15 Arm Circles    Squats X 15  PNF Diagonals    Hamstring Curls X 15 Wall Push Ups    Hip Flexion (SLR)  Figure 8's    Hip aBduction (SLR) X 15 Bilateral Pull Downs    Hip Extension (SLR) X 15       Hip aDduction (SLR)      Hip Circles X 15 Functional:    Hip IR/Er  Step up forward X14 L/R   TrA Set   Step up lateral     Pelvic Tilts   Step down     Fig 8's   Lunges Forward    LE PNF  Lunges Retro      Lunges Lateral     Balance:   Lower ab curl with noodle     SLS        Tandem Stance 30\" x 2 with N      NBOS eyes open  Seated:     NBOS eyes closed  Ankle pumps     Hand to Opposite Knee X Ankle Circles     Fwd Step ups to SLS  Knee Flex/Ext X10 L/R   Lateral Step ups to SLS  Hip aBd/aDd    Stop/Go Gait   Bicycle       Ankle DF/PF      Ankle Inv/Ev    Stretching:       Gastroc/Soleus X30\" X2 L/R     Hamstring  X30\" X2 L/R Noodle:     Knee Flex Stretch X Leg Press    Piriformis   Noodle Hang at [place]    Hip Flexor  Noodle Hang Deep Water    SKTC  Noodle Bicycle     DKTC       ITB      Quad  Deep Water:    Mid Back   Jog    UT  Jumping Jacks    Post Shoulder  Heel to Toe    Ladder Pull  Hand to Opposite Knee    Pec Stretch  Rocking Horse      [employer] Skier          Cervical:   Other: AROM Flexion   PT brought RW onto pool deck when pt. Shemar [employer] session on 4/26/19 as pt.'s R knee had signofocant pain and 2 episodes of buckling in the water this date. Patient used RW to go out to car as well. AROM Extension      AROM Side Bending      AROM Rotation      Chin Tucks      Chin Nods        Aquatic Abbreviation Key  B= Belt DB= Dumbells T= Theratube   H= Hydrotone N= Noodles W= Weights   P= Paddles S= Speedo equipment K= Kickboard     Other Therapeutic Activities:  Pt was educated on aquatic therapy intervention and POC as well as taken on tour of pool area in which pt was shown family changing rooms, how to utilize lockers what to wear for sessions as well as emphasized treatments take place in group setting. Pt was also educated on process of checking in at  and reporting back to pool area taking care with transition to pool area due to wet floors. Pt was also issued pool handout which outlines process, reiterates cancellation/no show policy as well as further instructions on accessing lockers. Home Exercise Program:      Manual Treatments:      Modalities:      Timed Code Treatment Minutes:  25    Total Treatment Minutes:  25    Treatment/Activity Tolerance:  [] Patient tolerated treatment well [] Patient limited by fatigue  [x] Patient limited by pain  [] Patient limited by other medical complications  [x] Other: significant pain with PT interventions today limiting progressions as well as pt needing prolonged rest break after each intervention. Pt continues to also be limiting with functional mobility taking significant time to get into clinic due to pain as well as affecting pt's start time in PT. Will continue to try to work on progressing strength as much as possible per pt tolerance.        Prognosis: [] Good [x] Fair  [] Poor    Patient Requires Follow-up: [x] Yes  [] No    Plan:   [x] Continue per plan of care [] Alter current plan (see comments)  [] Plan of care initiated [] Hold pending MD visit [] Discharge  Plan for Next Session:   therex hip/quad progression as able. Consider varying degrees of isometrics for quad activity, vs dynamic strengthening.      Electronically signed by:  Derrek Martinez PT

## 2019-05-10 ENCOUNTER — HOSPITAL ENCOUNTER (OUTPATIENT)
Dept: PHYSICAL THERAPY | Age: 69
Setting detail: THERAPIES SERIES
Discharge: HOME | End: 2019-05-10
Payer: MEDICARE

## 2019-05-10 NOTE — PROGRESS NOTES
Physical Therapy  Cancellation/No-show Note  Patient Name:  Meliton Conner  :  1950   Date:  5/10/2019  Cancelled visits to date: 0  No-shows to date: 2    Patient status for today's appointment patient:  []  Cancelled  []  Rescheduled appointment  [x]  No-show      Reason given by patient:  []  Patient ill  []  Conflicting appointment  []  No transportation    []  Conflict with work  []  No reason given  [x]  Other:     Comments:  Pt checked in today at 3:18 pm for a 3:30 land progress note PT appt. Went to get patient at 3:34 and pt was not in the waiting room. Checked all the bathrooms, aquatic locker room, bathroom, and the [employer] and bathrooms. Also checked the parking lot, and pt could not be found.   Will attempt to contact pt if he does not show up for his next scheduled PT aquatic appt on     Phone call information:   [x]  Phone call made today to patient at number provided:      [x]  Patient answered, conversation as follows:        []  Patient did not answer, message left as follows:  []  Phone call not made today    Electronically signed by:  Regina Cobb, PT

## 2019-05-13 ENCOUNTER — APPOINTMENT (OUTPATIENT)
Dept: PHYSICAL THERAPY | Age: 69
End: 2019-05-13
Payer: MEDICARE

## 2019-05-17 ENCOUNTER — APPOINTMENT (OUTPATIENT)
Dept: PHYSICAL THERAPY | Age: 69
End: 2019-05-17
Payer: MEDICARE

## 2019-07-18 ENCOUNTER — OFFICE VISIT (OUTPATIENT)
Dept: ORTHOPEDIC SURGERY | Age: 69
End: 2019-07-18
Payer: MEDICARE

## 2019-07-18 VITALS
SYSTOLIC BLOOD PRESSURE: 123 MMHG | WEIGHT: 315 LBS | HEART RATE: 66 BPM | BODY MASS INDEX: 40.43 KG/M2 | HEIGHT: 74 IN | DIASTOLIC BLOOD PRESSURE: 76 MMHG

## 2019-07-18 DIAGNOSIS — M25.561 RIGHT KNEE PAIN, UNSPECIFIED CHRONICITY: ICD-10-CM

## 2019-07-18 DIAGNOSIS — M17.0 BILATERAL PRIMARY OSTEOARTHRITIS OF KNEE: Primary | ICD-10-CM

## 2019-07-18 DIAGNOSIS — M25.562 LEFT KNEE PAIN, UNSPECIFIED CHRONICITY: ICD-10-CM

## 2019-07-18 PROCEDURE — G8417 CALC BMI ABV UP PARAM F/U: HCPCS | Performed by: ORTHOPAEDIC SURGERY

## 2019-07-18 PROCEDURE — 1036F TOBACCO NON-USER: CPT | Performed by: ORTHOPAEDIC SURGERY

## 2019-07-18 PROCEDURE — 99214 OFFICE O/P EST MOD 30 MIN: CPT | Performed by: ORTHOPAEDIC SURGERY

## 2019-07-18 PROCEDURE — G8427 DOCREV CUR MEDS BY ELIG CLIN: HCPCS | Performed by: ORTHOPAEDIC SURGERY

## 2019-07-18 PROCEDURE — 4040F PNEUMOC VAC/ADMIN/RCVD: CPT | Performed by: ORTHOPAEDIC SURGERY

## 2019-07-18 PROCEDURE — 3017F COLORECTAL CA SCREEN DOC REV: CPT | Performed by: ORTHOPAEDIC SURGERY

## 2019-07-18 PROCEDURE — 1123F ACP DISCUSS/DSCN MKR DOCD: CPT | Performed by: ORTHOPAEDIC SURGERY

## 2019-07-18 NOTE — PROGRESS NOTES
milligrams tablets. He denies any acute injury. He has a history of bilateral hip arthritis and has had multiple surgeries for his hip replacements the most recent of which was performed by  Dr Olivier Beltran. Howard Rios He feels that the hips are improved overall but walking is more limtied through the knees. He is tried anti-inflammatory medications as well as corticosteroid injections but is not tried hyaluronic acid injections.           Past Medical History:   Diagnosis Date    A-fib [facility])     AICD (automatic cardioverter/defibrillator) present 2017    [facility]--    [facility] [address] [facility]     Anesthesia     pt states blood pressure drops, he stated that he gets dizzy    Arthritis     [facility]estive [facility] failure) ([facility] [facility]     [facility] ([facility]     GERD (gastroesophageal reflux disease)     Hyperlipidemia     Hypertension     MOSES (obstructive sleep apnea)     uses bi-pap    Pulmonary hypertensive venous disease (HCC)     PVD (peripheral vascular disease) ([facility] [address] [facility]     Right ventricular enlargement     Sarcoidosis     Syncope       Social History     Socioeconomic History    Marital status: Single     Spouse name: Not on file    Number of children: Not on file    Years of education: Not on file    Highest education level: Not on file   Occupational History    Not on file   Social Needs    Financial resource strain: Not on file    Food insecurity:     Worry: Not on file     Inability: Not on file    Transportation needs:     Medical: Not on file     Non-medical: Not on file   Tobacco Use    Smoking status: Former Smoker     Packs/day: 0.25     Years: 1.00     Pack years: 0.25     Types: Cigarettes     Last attempt to quit: 1975     Years since quittin.2    Smokeless tobacco: Former User   Substance and Sexual Activity    Alcohol use: No    Drug use: No    Sexual activity: Never   Lifestyle    Physical activity:     Days per week: Not on file     Minutes per session: Not on file    Stress: Not on file   Relationships    Social connections:     Talks on phone: Not on file     Gets together: Not on file     Attends Jainism service: Not on file     Active member of club or organization: Not on file     Attends meetings of clubs or organizations: Not on file     Relationship status: Not on file    Intimate partner violence:     Fear of current or ex partner: Not on file     Emotionally abused: Not on file     Physically abused: Not on file     Forced sexual activity: Not on file   Other Topics Concern    Not on file   Social History Narrative    Not on file     Current Outpatient Medications on File Prior to Visit   Medication Sig Dispense Refill    warfarin (COUMADIN) 5 MG tablet TAKE AS DIRECTED BY ANTI-COAG CLINIC 180 tablet 2    torsemide (DEMADEX) 20 MG tablet Take 1 tablet by mouth 2 times daily 60 tablet 3    montelukast (SINGULAIR) 10 MG tablet TAKE 1 TABLET BY MOUTH NIGHTLY 30 tablet 3    amitriptyline (ELAVIL) 50 MG tablet TAKE 1 TABLET BY MOUTH NIGHTLY 30 tablet 0    atorvastatin (LIPITOR) 40 MG tablet TAKE 1 TABLET (40 MG TOTAL) BY MOUTH DAILY TO CONTROL CHOLESTEROL      DEXILANT 60 MG CPDR delayed release capsule TAKE 1 CAPSULE (60 MG TOTAL) BY MOUTH DAILY. 5    albuterol (PROVENTIL) (2.5 MG/3ML) 0.083% nebulizer solution       fluticasone (FLONASE) 50 MCG/ACT nasal spray       nitroGLYCERIN (NITROSTAT) 0.3 MG SL tablet Place 0.3 mg under the tongue as needed      oxyCODONE-acetaminophen (PERCOCET)  MG per tablet Take 1 tablet by mouth every 4 hours as needed  .       fluticasone-salmeterol (ADVAIR DISKUS) 250-50 MCG/DOSE AEPB Inhale 1 puff into the lungs every 12 hours 1 Inhaler 3    tiotropium (SPIRIVA HANDIHALER) 18 MCG inhalation capsule Inhale 18 mcg into the lungs daily       albuterol sulfate HFA (PROAIR HFA) 108 (90 BASE) MCG/ACT inhaler Inhale 2 puffs into the lungs every 6 hours as needed

## 2019-08-01 ENCOUNTER — TELEPHONE (OUTPATIENT)
Dept: ORTHOPEDIC SURGERY | Age: 69
End: 2019-08-01

## 2019-09-09 ENCOUNTER — TELEPHONE (OUTPATIENT)
Dept: ORTHOPEDIC SURGERY | Age: 69
End: 2019-09-09

## 2019-09-19 ENCOUNTER — OFFICE VISIT (OUTPATIENT)
Dept: ORTHOPEDIC SURGERY | Age: 69
End: 2019-09-19
Payer: MEDICARE

## 2019-09-19 VITALS
HEART RATE: 73 BPM | SYSTOLIC BLOOD PRESSURE: 128 MMHG | HEIGHT: 74 IN | DIASTOLIC BLOOD PRESSURE: 87 MMHG | WEIGHT: 315 LBS | BODY MASS INDEX: 40.43 KG/M2

## 2019-09-19 DIAGNOSIS — M25.561 RIGHT KNEE PAIN, UNSPECIFIED CHRONICITY: Primary | ICD-10-CM

## 2019-09-19 DIAGNOSIS — M25.562 LEFT KNEE PAIN, UNSPECIFIED CHRONICITY: ICD-10-CM

## 2019-09-19 DIAGNOSIS — M17.0 BILATERAL PRIMARY OSTEOARTHRITIS OF KNEE: ICD-10-CM

## 2019-09-19 PROCEDURE — 1036F TOBACCO NON-USER: CPT | Performed by: ORTHOPAEDIC SURGERY

## 2019-09-19 PROCEDURE — G8427 DOCREV CUR MEDS BY ELIG CLIN: HCPCS | Performed by: ORTHOPAEDIC SURGERY

## 2019-09-19 PROCEDURE — 1123F ACP DISCUSS/DSCN MKR DOCD: CPT | Performed by: ORTHOPAEDIC SURGERY

## 2019-09-19 PROCEDURE — E0114 CRUTCH UNDERARM PAIR NO WOOD: HCPCS | Performed by: ORTHOPAEDIC SURGERY

## 2019-09-19 PROCEDURE — 3017F COLORECTAL CA SCREEN DOC REV: CPT | Performed by: ORTHOPAEDIC SURGERY

## 2019-09-19 PROCEDURE — 4040F PNEUMOC VAC/ADMIN/RCVD: CPT | Performed by: ORTHOPAEDIC SURGERY

## 2019-09-19 PROCEDURE — G8417 CALC BMI ABV UP PARAM F/U: HCPCS | Performed by: ORTHOPAEDIC SURGERY

## 2019-09-19 PROCEDURE — 99214 OFFICE O/P EST MOD 30 MIN: CPT | Performed by: ORTHOPAEDIC SURGERY

## 2019-09-19 NOTE — PROGRESS NOTES
drops,suspension      sucralfate (CARAFATE) 1 GM tablet sucralfate 1 gram tablet      warfarin (COUMADIN) 5 MG tablet TAKE AS DIRECTED BY ANTI-COAG CLINIC (Patient taking differently: Monday and Wednesday) 180 tablet 2    torsemide (DEMADEX) 20 MG tablet Take 1 tablet by mouth 2 times daily 60 tablet 3    montelukast (SINGULAIR) 10 MG tablet TAKE 1 TABLET BY MOUTH NIGHTLY 30 tablet 3    amitriptyline (ELAVIL) 50 MG tablet TAKE 1 TABLET BY MOUTH NIGHTLY 30 tablet 0    atorvastatin (LIPITOR) 40 MG tablet TAKE 1 TABLET (40 MG TOTAL) BY MOUTH DAILY TO CONTROL CHOLESTEROL      DEXILANT 60 MG CPDR delayed release capsule TAKE 1 CAPSULE (60 MG TOTAL) BY MOUTH DAILY. 5    albuterol (PROVENTIL) (2.5 MG/3ML) 0.083% nebulizer solution       fluticasone (FLONASE) 50 MCG/ACT nasal spray       nitroGLYCERIN (NITROSTAT) 0.3 MG SL tablet Place 0.3 mg under the tongue as needed      oxyCODONE-acetaminophen (PERCOCET)  MG per tablet Take 1 tablet by mouth every 4 hours as needed  .  tiotropium (SPIRIVA HANDIHALER) 18 MCG inhalation capsule Inhale 18 mcg into the lungs daily       albuterol sulfate HFA (PROAIR HFA) 108 (90 BASE) MCG/ACT inhaler Inhale 2 puffs into the lungs every 6 hours as needed for Wheezing With spacer 1 Inhaler 3    warfarin (COUMADIN) 10 MG tablet Take 10 mg by mouth Indications: Sunday, Tuesday, Wednesday, Friday and Saturday       warfarin (COUMADIN) 7.5 MG tablet Take 7.5 mg by mouth Indications: Monday  and Thursday        No current facility-administered medications on file prior to visit.       Allergies   Allergen Reactions    Methotrexate Anaphylaxis    Morphine Shortness Of Breath     Occurred at [facility] - unsure if related to morphine or COPD no hives or rash    Iodinated Diagnostic Agents Other (See Comments)     IV dye - sweating, shakiness    Methadone Other (See Comments)     hallucinations    Sodium Iodide Rash       Review of Systems:  A 14 point review of systems

## 2019-09-20 ENCOUNTER — TELEPHONE (OUTPATIENT)
Dept: ORTHOPEDIC SURGERY | Age: 69
End: 2019-09-20

## 2019-10-31 ENCOUNTER — OFFICE VISIT (OUTPATIENT)
Dept: ORTHOPEDIC SURGERY | Age: 69
End: 2019-10-31
Payer: MEDICARE

## 2019-10-31 VITALS
HEIGHT: 74 IN | DIASTOLIC BLOOD PRESSURE: 78 MMHG | WEIGHT: 315 LBS | BODY MASS INDEX: 40.43 KG/M2 | HEART RATE: 72 BPM | SYSTOLIC BLOOD PRESSURE: 116 MMHG

## 2019-10-31 DIAGNOSIS — M25.561 RIGHT KNEE PAIN, UNSPECIFIED CHRONICITY: Primary | ICD-10-CM

## 2019-10-31 DIAGNOSIS — M25.562 LEFT KNEE PAIN, UNSPECIFIED CHRONICITY: ICD-10-CM

## 2019-10-31 DIAGNOSIS — M17.0 BILATERAL PRIMARY OSTEOARTHRITIS OF KNEE: ICD-10-CM

## 2019-10-31 PROCEDURE — 3017F COLORECTAL CA SCREEN DOC REV: CPT | Performed by: ORTHOPAEDIC SURGERY

## 2019-10-31 PROCEDURE — 1036F TOBACCO NON-USER: CPT | Performed by: ORTHOPAEDIC SURGERY

## 2019-10-31 PROCEDURE — G8428 CUR MEDS NOT DOCUMENT: HCPCS | Performed by: ORTHOPAEDIC SURGERY

## 2019-10-31 PROCEDURE — G8484 FLU IMMUNIZE NO ADMIN: HCPCS | Performed by: ORTHOPAEDIC SURGERY

## 2019-10-31 PROCEDURE — 1123F ACP DISCUSS/DSCN MKR DOCD: CPT | Performed by: ORTHOPAEDIC SURGERY

## 2019-10-31 PROCEDURE — 4040F PNEUMOC VAC/ADMIN/RCVD: CPT | Performed by: ORTHOPAEDIC SURGERY

## 2019-10-31 PROCEDURE — G8417 CALC BMI ABV UP PARAM F/U: HCPCS | Performed by: ORTHOPAEDIC SURGERY

## 2019-10-31 PROCEDURE — 99214 OFFICE O/P EST MOD 30 MIN: CPT | Performed by: ORTHOPAEDIC SURGERY

## 2019-10-31 RX ORDER — NAPROXEN 500 MG/1
500 TABLET ORAL 2 TIMES DAILY
COMMUNITY
Start: 2019-10-30

## 2020-01-02 ENCOUNTER — OFFICE VISIT (OUTPATIENT)
Dept: ORTHOPEDIC SURGERY | Age: 70
End: 2020-01-02
Payer: MEDICARE

## 2020-01-02 VITALS
DIASTOLIC BLOOD PRESSURE: 80 MMHG | HEIGHT: 74 IN | BODY MASS INDEX: 40.43 KG/M2 | WEIGHT: 315 LBS | HEART RATE: 67 BPM | SYSTOLIC BLOOD PRESSURE: 121 MMHG

## 2020-01-02 PROCEDURE — 1036F TOBACCO NON-USER: CPT | Performed by: ORTHOPAEDIC SURGERY

## 2020-01-02 PROCEDURE — 99214 OFFICE O/P EST MOD 30 MIN: CPT | Performed by: ORTHOPAEDIC SURGERY

## 2020-01-02 PROCEDURE — G8427 DOCREV CUR MEDS BY ELIG CLIN: HCPCS | Performed by: ORTHOPAEDIC SURGERY

## 2020-01-02 PROCEDURE — 4040F PNEUMOC VAC/ADMIN/RCVD: CPT | Performed by: ORTHOPAEDIC SURGERY

## 2020-01-02 PROCEDURE — G8484 FLU IMMUNIZE NO ADMIN: HCPCS | Performed by: ORTHOPAEDIC SURGERY

## 2020-01-02 PROCEDURE — 3017F COLORECTAL CA SCREEN DOC REV: CPT | Performed by: ORTHOPAEDIC SURGERY

## 2020-01-02 PROCEDURE — G8417 CALC BMI ABV UP PARAM F/U: HCPCS | Performed by: ORTHOPAEDIC SURGERY

## 2020-01-02 PROCEDURE — 1123F ACP DISCUSS/DSCN MKR DOCD: CPT | Performed by: ORTHOPAEDIC SURGERY

## 2020-01-02 PROCEDURE — 20610 DRAIN/INJ JOINT/BURSA W/O US: CPT | Performed by: ORTHOPAEDIC SURGERY

## 2020-01-02 RX ORDER — TRIAMCINOLONE ACETONIDE 40 MG/ML
40 INJECTION, SUSPENSION INTRA-ARTICULAR; INTRAMUSCULAR ONCE
Status: COMPLETED | OUTPATIENT
Start: 2020-01-02 | End: 2020-01-02

## 2020-01-02 RX ADMIN — TRIAMCINOLONE ACETONIDE 40 MG: 40 INJECTION, SUSPENSION INTRA-ARTICULAR; INTRAMUSCULAR at 17:05

## 2020-01-02 NOTE — PROGRESS NOTES
KLOR-CON M20 20 MEQ extended release tablet TAKE 2 TABLETS (40 MEQ TOTAL) BY MOUTH 2 TIMES A DAY. 5/28/19   Historical Provider, MD   prednisoLONE acetate (PRED FORTE) 1 % ophthalmic suspension prednisolone acetate 1 % eye drops,suspension 6/28/19   Historical Provider, MD   sucralfate (CARAFATE) 1 GM tablet sucralfate 1 gram tablet 6/27/19   Historical Provider, MD   warfarin (COUMADIN) 5 MG tablet TAKE AS DIRECTED BY ANTI-COAG CLINIC  Patient taking differently: Monday and Wednesday 1/29/19   Daniel Meyers MD   torsemide (DEMADEX) 20 MG tablet Take 1 tablet by mouth 2 times daily 3/12/18   Daniel Meyers MD   montelukast (SINGULAIR) 10 MG tablet TAKE 1 TABLET BY MOUTH NIGHTLY 12/20/17   Daniel Meyers MD   amitriptyline (ELAVIL) 50 MG tablet TAKE 1 TABLET BY MOUTH NIGHTLY 12/8/17   Daniel Meyers MD   atorvastatin (LIPITOR) 40 MG tablet TAKE 1 TABLET (40 MG TOTAL) BY MOUTH DAILY TO CONTROL CHOLESTEROL 6/1/17   Historical Provider, MD   DEXILANT 60 MG CPDR delayed release capsule TAKE 1 CAPSULE (60 MG TOTAL) BY MOUTH DAILY. 9/21/17   Historical Provider, MD   albuterol (PROVENTIL) (2.5 MG/3ML) 0.083% nebulizer solution  8/2/17   Historical Provider, MD   fluticasone [facility]) 50 MCG/ACT nasal spray  9/11/17   Historical Provider, MD   nitroGLYCERIN (NITROSTAT) 0.3 MG SL tablet Place 0.3 mg under the tongue as needed 8/15/17   Historical Provider, MD   oxyCODONE-acetaminophen (PERCOCET)  MG per tablet Take 1 tablet by mouth every 4 hours as needed  .  6/1/17   Historical Provider, MD   tiotropium (SPIRIVA HANDIHALER) 18 MCG inhalation capsule Inhale 18 mcg into the lungs daily  3/16/17   Historical Provider, MD   albuterol sulfate HFA (PROAIR HFA) 108 (90 BASE) MCG/ACT inhaler Inhale 2 puffs into the lungs every 6 hours as needed for Wheezing With spacer 3/10/17   Daniel Meyers MD   warfarin (COUMADIN) 10 MG tablet Take 10 mg by mouth Indications: Sunday, Tuesday, Wednesday, Friday and Saturday     Historical Provider, MD   warfarin (COUMADIN) 7.5 MG tablet Take 7.5 mg by mouth Indications: Monday  and Thursday     Historical Provider, MD     Allergies: Allergies   Allergen Reactions    Methotrexate Anaphylaxis    Morphine Shortness Of Breath     Occurred at [facility] - unsure if related to morphine or COPD no hives or rash    Iodinated Diagnostic Agents Other (See Comments)     IV dye - sweating, shakiness    Methadone Other (See Comments)     hallucinations    Sodium Iodide Rash       Review of Systems:     Review of Systems ______  Constitutional: Negative for fever and diaphoresis. ____________  Respiratory: Negative for shortness of breath.  ________  Gastrointestinal: Negative for abdominal pain. ________  Musculoskeletal: Positive for joint pain. ____  Skin: Negative for itching. ____  Neurological: Negative for loss of consciousness.  ______________  All other systems reviewed and negative     Past Medical History:   Diagnosis Date    A-fib [facility])     AICD (automatic cardioverter/defibrillator) present 07/13/2017    [facility]--[employer]--    SUHA (acute kidney injury) ([facility] [address]     Anesthesia     pt states blood pressure drops, he stated that he gets dizzy    Arthritis     Asthma     CHF (congestive heart failure) ([address]     COPD (chronic obstructive pulmonary disease) (HCC)     GERD (gastroesophageal reflux disease)     Hyperlipidemia     Hypertension     MOSES (obstructive sleep apnea)     uses bi-pap    Pulmonary hypertensive venous disease (HCC)     PVD (peripheral vascular disease) ([facility] [address]     Right ventricular enlargement     Sarcoidosis     Syncope      Family History   Problem Relation Age of Onset    Heart Disease Father         refused pacemaker    High Blood Pressure Father     High Blood Pressure Mother     Other Mother         DVT    High Blood Pressure Sister      Social History     Socioeconomic History    Marital status: Single     Spouse name: Not on Signs:      Vitals:    01/02/20 1623   BP: 121/80   Pulse: 67       bilateral Knee shows evidence for DJD with  obvious pseudolaxity, pain with weight bearing, antalgic gait and palpable osteophytes. Inspection: Moderate anterior swelling. Swelling is present with  Moderate left greater than right effusion. The posterior aspect of the knee appears to be full with tenderness. There is no erythema, rash, or ecchymosis. Range of Motion:  Right 0-100 left0-95     Pain with varus left greater than right  testing    There is deformityvarus bilaterally    Strength:  Hamstrings rated: 4/5. Quadriceps rated: 5/5    Palpation: There is moderate tenderness along the medial bone tibia distal femur and left medial joint line. Special Tests: Patellar Compression test is positive. Valgus & Varus test is positive bilaterally but worse on the left side. Skin: There are no rashes, ulcerations or lesions. Gait: Gait pattern is antalgic  Skin shows no rashes/ecchymosis to the affected area, no hyperesthesias, no discoloration, no temperature or color discrepancies. NEUROLOGICALLY: There is no evidence for sensory or motor deficits in the extremity. Coordination appears full with no spacticity or rigidity. Reflexes appear to be symmetric. Distal circulation intact. No signs of RSD. Additional Comments:less overall pain and irritation of the right knee. Procedure(s): none    Diagnostic Test Findings:   Xray   No new xrays today    Assessment and Plan:       Diagnosis Orders   1. Right knee pain, unspecified chronicity     2. Left knee pain, unspecified chronicity  OR ARTHROCENTESIS ASPIR&/INJ MAJOR JT/BURSA W/O US    triamcinolone acetonide (KENALOG-40) injection 40 mg    MRI Knee Left WO Contrast   3.  Bilateral primary osteoarthritis of knee  OR ARTHROCENTESIS ASPIR&/INJ MAJOR JT/BURSA W/O US    triamcinolone acetonide (KENALOG-40) injection 40 mg              The orders below, if any, were placed

## 2020-01-23 ENCOUNTER — HOSPITAL ENCOUNTER (OUTPATIENT)
Dept: MRI IMAGING | Age: 70
Discharge: HOME | End: 2020-01-23
Payer: MEDICARE

## 2020-01-23 VITALS — HEART RATE: 80 BPM

## 2020-01-23 PROCEDURE — 73721 MRI JNT OF LWR EXTRE W/O DYE: CPT

## 2020-01-23 NOTE — FLOWSHEET NOTE
Monitored patient during MRI with dual chamber defibrilator. Temporary setting applied by rep Raimundo from [employer] according to MD order. Patient is alert and orientated x 4. Initial HR 80, breathing easily on room air. Patient tolerated study well and VSS throughout MRI. Upon completion of study patient Tamia Rome from [employer] was present to reset device to original settings.  See nursing flowsheet for VS

## 2020-02-13 ENCOUNTER — OFFICE VISIT (OUTPATIENT)
Dept: ORTHOPEDIC SURGERY | Age: 70
End: 2020-02-13
Payer: MEDICARE

## 2020-02-13 VITALS
SYSTOLIC BLOOD PRESSURE: 135 MMHG | HEART RATE: 68 BPM | HEIGHT: 74 IN | WEIGHT: 315 LBS | DIASTOLIC BLOOD PRESSURE: 88 MMHG | BODY MASS INDEX: 40.43 KG/M2

## 2020-02-13 PROCEDURE — 99214 OFFICE O/P EST MOD 30 MIN: CPT | Performed by: ORTHOPAEDIC SURGERY

## 2020-02-13 PROCEDURE — 1036F TOBACCO NON-USER: CPT | Performed by: ORTHOPAEDIC SURGERY

## 2020-02-13 PROCEDURE — 3017F COLORECTAL CA SCREEN DOC REV: CPT | Performed by: ORTHOPAEDIC SURGERY

## 2020-02-13 PROCEDURE — G8427 DOCREV CUR MEDS BY ELIG CLIN: HCPCS | Performed by: ORTHOPAEDIC SURGERY

## 2020-02-13 PROCEDURE — 4040F PNEUMOC VAC/ADMIN/RCVD: CPT | Performed by: ORTHOPAEDIC SURGERY

## 2020-02-13 PROCEDURE — G8484 FLU IMMUNIZE NO ADMIN: HCPCS | Performed by: ORTHOPAEDIC SURGERY

## 2020-02-13 PROCEDURE — 1123F ACP DISCUSS/DSCN MKR DOCD: CPT | Performed by: ORTHOPAEDIC SURGERY

## 2020-02-13 PROCEDURE — G8417 CALC BMI ABV UP PARAM F/U: HCPCS | Performed by: ORTHOPAEDIC SURGERY

## 2020-02-18 ENCOUNTER — TELEPHONE (OUTPATIENT)
Dept: ORTHOPEDIC SURGERY | Age: 70
End: 2020-02-18

## 2020-02-18 NOTE — TELEPHONE ENCOUNTER
Pt calling to get scheduled for a stem cell procedure. Please advise.        Call [phone] to discuss

## 2020-03-10 ENCOUNTER — TELEPHONE (OUTPATIENT)
Dept: ORTHOPEDIC SURGERY | Age: 70
End: 2020-03-10

## 2020-03-11 NOTE — TELEPHONE ENCOUNTER
Surgery date changed to 4/3/20. Valid dates for surgery are 3/13/2020 thru 6/11/2020. Authorization information the same.

## 2021-02-13 NOTE — PROGRESS NOTES
Discharge instructions given to patient and family member. Verbalize understanding. Patient is on medical hold and unable to be seen by Cardiac Rehabilitation at this time. Will follow to progress.